# Patient Record
Sex: MALE | Race: WHITE | HISPANIC OR LATINO | Employment: OTHER | URBAN - METROPOLITAN AREA
[De-identification: names, ages, dates, MRNs, and addresses within clinical notes are randomized per-mention and may not be internally consistent; named-entity substitution may affect disease eponyms.]

---

## 2017-01-11 RX ORDER — CLOPIDOGREL BISULFATE 75 MG/1
TABLET ORAL
Qty: 90 TABLET | Refills: 2 | Status: SHIPPED | OUTPATIENT
Start: 2017-01-11 | End: 2017-10-08 | Stop reason: SDUPTHER

## 2017-01-23 RX ORDER — CARVEDILOL 6.25 MG/1
TABLET ORAL
Qty: 180 TABLET | Refills: 2 | Status: SHIPPED | OUTPATIENT
Start: 2017-01-23 | End: 2017-02-01

## 2017-02-01 ENCOUNTER — OFFICE VISIT (OUTPATIENT)
Dept: CARDIOLOGY | Facility: CLINIC | Age: 62
End: 2017-02-01
Payer: OTHER GOVERNMENT

## 2017-02-01 VITALS
OXYGEN SATURATION: 97 % | WEIGHT: 234.13 LBS | HEART RATE: 94 BPM | SYSTOLIC BLOOD PRESSURE: 170 MMHG | BODY MASS INDEX: 34.68 KG/M2 | HEIGHT: 69 IN | DIASTOLIC BLOOD PRESSURE: 99 MMHG

## 2017-02-01 DIAGNOSIS — I25.83 CORONARY ARTERY DISEASE DUE TO LIPID RICH PLAQUE: Primary | ICD-10-CM

## 2017-02-01 DIAGNOSIS — I10 ESSENTIAL HYPERTENSION: ICD-10-CM

## 2017-02-01 DIAGNOSIS — E66.01 MORBID OBESITY DUE TO EXCESS CALORIES: ICD-10-CM

## 2017-02-01 DIAGNOSIS — I25.10 CORONARY ARTERY DISEASE DUE TO LIPID RICH PLAQUE: Primary | ICD-10-CM

## 2017-02-01 DIAGNOSIS — I21.4 NSTEMI (NON-ST ELEVATED MYOCARDIAL INFARCTION): ICD-10-CM

## 2017-02-01 DIAGNOSIS — E78.5 DYSLIPIDEMIA: ICD-10-CM

## 2017-02-01 PROCEDURE — 99999 PR PBB SHADOW E&M-EST. PATIENT-LVL III: CPT | Mod: PBBFAC,,, | Performed by: INTERNAL MEDICINE

## 2017-02-01 PROCEDURE — 99213 OFFICE O/P EST LOW 20 MIN: CPT | Mod: PBBFAC | Performed by: INTERNAL MEDICINE

## 2017-02-01 PROCEDURE — 99214 OFFICE O/P EST MOD 30 MIN: CPT | Mod: S$PBB,,, | Performed by: INTERNAL MEDICINE

## 2017-02-01 RX ORDER — METOPROLOL SUCCINATE 100 MG/1
100 TABLET, EXTENDED RELEASE ORAL DAILY
Qty: 30 TABLET | Refills: 3 | Status: SHIPPED | OUTPATIENT
Start: 2017-02-01 | End: 2018-05-02 | Stop reason: SDUPTHER

## 2017-02-01 NOTE — PROGRESS NOTES
Subjective:    Patient ID:  Steven Sommers is a 61 y.o. male who presents for follow-up of Coronary Artery Disease      Coronary Artery Disease    previous interval history:  Patient is here for follow-up of CAD and chest pain.  He says he had some recurrent left-sided chest pains which felt like little finger sticks.  He described them as 3 out of 10 on pain scale.  There were lesser in severity but similar to his presentation in the hospital when he had his heart attack.  He was at rest without any significant activities.  He says it was not necessarily related to meals.  He was mainly concerned and wanted it make sure there was no problem.  He's been exercising very regularly without any issues.  He did some cardiac rehabilitation without any problems.    Today:  Patient is here for follow-up of CAD and chest pain.  No longer having any significant chest pain symptoms.  He's not exercised in over 3 weeks due to recent travel to [facility].  He wants to get back into exercising is going to the gym tomorrow.  He describes no chest pain, shortness of breath or palpitations.  He's expands no PND, orthopnea or lower edema.  He denies any dizziness, presyncope or syncope.  He's concerned currently about fluid retention.    Review of Systems   Constitution: Negative.   HENT: Negative.    Eyes: Negative.    Cardiovascular: Negative for dyspnea on exertion, irregular heartbeat, near-syncope, orthopnea, paroxysmal nocturnal dyspnea and syncope.   Skin: Negative.    Musculoskeletal: Negative.    Gastrointestinal: Negative for abdominal pain, constipation and diarrhea.   Genitourinary: Negative for dysuria.   Psychiatric/Behavioral: Negative.         Objective:    Physical Exam   Constitutional: He is oriented to person, place, and time. He appears well-developed and well-nourished. No distress.   HENT:   Head: Normocephalic and atraumatic.   Eyes: Conjunctivae and EOM are normal. Pupils are equal, round, and reactive to  light.   Neck: Normal range of motion. Neck supple. No thyromegaly present.   Cardiovascular: Normal rate, regular rhythm and normal heart sounds.    No murmur heard.  Pulmonary/Chest: Effort normal and breath sounds normal. No respiratory distress. He has no wheezes. He has no rales. He exhibits no tenderness.   Abdominal: Soft. Bowel sounds are normal.   Musculoskeletal: He exhibits no edema.   Neurological: He is alert and oriented to person, place, and time.   Skin: Skin is warm and dry.   Psychiatric: He has a normal mood and affect. His behavior is normal.       EKG normal sinus rhythm criteria for LVH (*no change from previous)    Assessment:       1. Coronary artery disease due to lipid rich plaque    2. NSTEMI (non-ST elevated myocardial infarction)    3. Essential hypertension    4. Dyslipidemia    5. Morbid obesity due to excess calories         Plan:       -Mainly reassurance with no significant changes  -follow symptoms closely  -SBP elevated today, continue follow  -Wanted to change to single dosing of beta blocker so we will adjust to Toprol-XL from carvedilol    Return to clinic in 3 mos

## 2017-02-01 NOTE — MR AVS SNAPSHOT
[facility]  [address] [place] [address]  Phone: [phone]                  Steven Sommers   2017 9:40 AM   Office Visit    Description:  Male : 1955   Provider:  Mariusz Prabhakar MD   Department:  [facility]           Reason for Visit     Coronary Artery Disease                To Do List           Future Appointments        Provider Department Dept Phone    2017 9:40 AM Mariusz Prabhakar MD [facility] [phone]      Goals (5 Years of Data)     None      [facility]     [facility] Care Line -  Assistance  Registered nurses in the [facility] On Call Center provide clinical advisement, health education, appointment booking, and other advisory services.  Call for this free service at [phone].             Medications           Message regarding Medications     Verify the changes and/or additions to your medication regime listed below are the same as discussed with your clinician today.  If any of these changes or additions are incorrect, please notify your healthcare provider.             Verify that the below list of medications is an accurate representation of the medications you are currently taking.  If none reported, the list may be blank. If incorrect, please contact your healthcare provider. Carry this list with you in case of emergency.           Current Medications     aspirin (ECOTRIN) 81 MG EC tablet Take 1 tablet (81 mg total) by mouth once daily.    atorvastatin (LIPITOR) 80 MG tablet Take 1 tablet (80 mg total) by mouth once daily.    carvedilol (COREG) 6.25 MG tablet TAKE 1 TABLET TWICE A DAY    CIALIS 20 mg Tab     clopidogrel (PLAVIX) 75 mg tablet TAKE 1 TABLET DAILY    fluticasone (FLONASE) 50 mcg/actuation nasal spray 2 sprays by Each Nare route once daily.    lisinopril-hydrochlorothiazide (PRINZIDE,ZESTORETIC) 10-12.5 mg per tablet Take 1 tablet by mouth once daily.           Clinical Reference  "Information           Vital Signs - Last Recorded  Most recent update: 2/1/2017  8:30 AM by Sheila Calloway MA    BP Pulse Ht Wt SpO2 BMI    (!) 170/99 (BP Location: Left arm, Patient Position: Sitting, BP Method: Automatic) 94 5' 9" (1.753 m) 106.2 kg (234 lb 2.1 oz) 97% 34.57 kg/m2      Blood Pressure          Most Recent Value    BP  (!)  170/99      Allergies as of 2/1/2017     No Known Allergies      Immunizations Administered on Date of Encounter - 2/1/2017     None      [facility] Sign-Up     Activating your [facility] account is as easy as 1-2-3!     1) Visit [email or web address], select Sign Up Now, enter this activation code and your date of birth, then select Next.  [email or web address]  Expires: 3/18/2017  8:32 AM      2) Create a username and password to use when you visit [facility] in the future and select a security question in case you lose your password and select Next.    3) Enter your e-mail address and click Sign Up!    Additional Information  If you have questions, please e-mail [email or web address] or call [phone] to talk to our [facility] staff. Remember, [facility] is NOT to be used for urgent needs. For medical emergencies, dial 911.         "

## 2017-02-09 RX ORDER — ATORVASTATIN CALCIUM 80 MG/1
TABLET, FILM COATED ORAL
Qty: 90 TABLET | Refills: 2 | Status: SHIPPED | OUTPATIENT
Start: 2017-02-09 | End: 2018-01-30 | Stop reason: SDUPTHER

## 2017-05-01 ENCOUNTER — OFFICE VISIT (OUTPATIENT)
Dept: CARDIOLOGY | Facility: CLINIC | Age: 62
End: 2017-05-01
Payer: OTHER GOVERNMENT

## 2017-05-01 VITALS
BODY MASS INDEX: 33.18 KG/M2 | DIASTOLIC BLOOD PRESSURE: 93 MMHG | HEART RATE: 83 BPM | SYSTOLIC BLOOD PRESSURE: 139 MMHG | HEIGHT: 69 IN | OXYGEN SATURATION: 95 % | WEIGHT: 224 LBS

## 2017-05-01 DIAGNOSIS — I25.83 CORONARY ARTERY DISEASE DUE TO LIPID RICH PLAQUE: Primary | ICD-10-CM

## 2017-05-01 DIAGNOSIS — I25.10 CORONARY ARTERY DISEASE DUE TO LIPID RICH PLAQUE: Primary | ICD-10-CM

## 2017-05-01 DIAGNOSIS — E78.5 DYSLIPIDEMIA: ICD-10-CM

## 2017-05-01 DIAGNOSIS — I21.4 NSTEMI (NON-ST ELEVATED MYOCARDIAL INFARCTION): ICD-10-CM

## 2017-05-01 DIAGNOSIS — E66.01 MORBID OBESITY DUE TO EXCESS CALORIES: ICD-10-CM

## 2017-05-01 DIAGNOSIS — I10 ESSENTIAL HYPERTENSION: ICD-10-CM

## 2017-05-01 PROCEDURE — 99999 PR PBB SHADOW E&M-EST. PATIENT-LVL III: CPT | Mod: PBBFAC,,, | Performed by: INTERNAL MEDICINE

## 2017-05-01 PROCEDURE — 99213 OFFICE O/P EST LOW 20 MIN: CPT | Mod: PBBFAC | Performed by: INTERNAL MEDICINE

## 2017-05-01 PROCEDURE — 99214 OFFICE O/P EST MOD 30 MIN: CPT | Mod: S$PBB,,, | Performed by: INTERNAL MEDICINE

## 2017-05-01 NOTE — PROGRESS NOTES
Subjective:    Patient ID:  Steven Sommers is a 61 y.o. male who presents for follow-up of Coronary Artery Disease      Coronary Artery Disease   Symptoms include chest pain.    previous interval history:  Patient is here for follow-up of CAD and chest pain.  No longer having any significant chest pain symptoms.  He's not exercised in over 3 weeks due to recent travel to [facility].  He wants to get back into exercising is going to the gym tomorrow.  He describes no chest pain, shortness of breath or palpitations.  He's expands no PND, orthopnea or lower edema.  He denies any dizziness, presyncope or syncope.  He's concerned currently about fluid retention.    Today:  Here for follow-up of CAD and chest pain.  Patient had 2 episodes of chest pain last 3 months.  He said they were sharp and can of like a poking.  He did not take nitroglycerin.  They lasted seconds and went away.  He's not been exercising regularly.  He's traveling frequently to [facility] to build a house there.  He denies any associated symptoms in terms shortness breath or palpitations.  He denies any PND, orthopnea or lower edema.  He is not expressing dizziness, presyncope or syncope.  He has not had any significant other issues.  He's trying to moderate his diet.    Review of Systems   Constitution: Negative.   HENT: Negative.    Eyes: Negative.    Cardiovascular: Positive for chest pain. Negative for dyspnea on exertion, irregular heartbeat, near-syncope, orthopnea, paroxysmal nocturnal dyspnea and syncope.   Skin: Negative.    Musculoskeletal: Negative.    Gastrointestinal: Negative for abdominal pain, constipation and diarrhea.   Genitourinary: Negative for dysuria.   Psychiatric/Behavioral: Negative.         Objective:    Physical Exam   Constitutional: He is oriented to person, place, and time. He appears well-developed and well-nourished. No distress.   HENT:   Head: Normocephalic and atraumatic.   Eyes: Conjunctivae and EOM are normal.  Pupils are equal, round, and reactive to light.   Neck: Normal range of motion. Neck supple. No thyromegaly present.   Cardiovascular: Normal rate, regular rhythm and normal heart sounds.    No murmur heard.  Pulmonary/Chest: Effort normal and breath sounds normal. No respiratory distress. He has no wheezes. He has no rales. He exhibits no tenderness.   Abdominal: Soft. Bowel sounds are normal.   Musculoskeletal: He exhibits no edema.   Neurological: He is alert and oriented to person, place, and time.   Skin: Skin is warm and dry.   Psychiatric: He has a normal mood and affect. His behavior is normal.         Assessment:       1. Coronary artery disease due to lipid rich plaque    2. NSTEMI (non-ST elevated myocardial infarction)    3. Dyslipidemia    4. Essential hypertension    5. Morbid obesity due to excess calories         Plan:       -Mainly reassurance with no significant changes  -follow symptoms closely  -Continue follow SBP    Return to clinic in 6 mos

## 2017-05-01 NOTE — MR AVS SNAPSHOT
[facility]  [address] [place] [address]  Phone: [phone]                  Steven Sommers   2017 8:20 AM   Office Visit    Description:  Male : 1955   Provider:  Mariusz Prabhakar MD   Department:  [facility]           Reason for Visit     Coronary Artery Disease                To Do List           Goals (5 Years of Data)     None      [facility]     [facility] On Call Nurse Care Line -  Assistance  Unless otherwise directed by your provider, please contact [facility], our nurse care line that is available for  assistance.     Registered nurses in the [facility] provide: appointment scheduling, clinical advisement, health education, and other advisory services.  Call: [phone] (toll free)               Medications           Message regarding Medications     Verify the changes and/or additions to your medication regime listed below are the same as discussed with your clinician today.  If any of these changes or additions are incorrect, please notify your healthcare provider.             Verify that the below list of medications is an accurate representation of the medications you are currently taking.  If none reported, the list may be blank. If incorrect, please contact your healthcare provider. Carry this list with you in case of emergency.           Current Medications     aspirin (ECOTRIN) 81 MG EC tablet Take 1 tablet (81 mg total) by mouth once daily.    atorvastatin (LIPITOR) 80 MG tablet TAKE 1 TABLET DAILY    CIALIS 20 mg Tab     clopidogrel (PLAVIX) 75 mg tablet TAKE 1 TABLET DAILY    fluticasone (FLONASE) 50 mcg/actuation nasal spray 2 sprays by Each Nare route once daily.    lisinopril-hydrochlorothiazide (PRINZIDE,ZESTORETIC) 10-12.5 mg per tablet Take 1 tablet by mouth once daily.    metoprolol succinate (TOPROL-XL) 100 MG 24 hr tablet Take 1 tablet (100 mg total) by mouth once daily.           Clinical  "Reference Information           Your Vitals Were     BP Pulse Height Weight SpO2 BMI    139/93 (BP Location: Left arm, Patient Position: Sitting, BP Method: Automatic) 83 5' 9" (1.753 m) 101.6 kg (224 lb) 95% 33.08 kg/m2      Blood Pressure          Most Recent Value    BP  (!)  139/93      Allergies as of 5/1/2017     No Known Allergies      Immunizations Administered on Date of Encounter - 5/1/2017     None      [facility] Sign-Up     Activating your [facility] account is as easy as 1-2-3!     1) Visit [email or web address], select Sign Up Now, enter this activation code and your date of birth, then select Next.  [email or web address]  Expires: 6/15/2017  8:27 AM      2) Create a username and password to use when you visit [facility] in the future and select a security question in case you lose your password and select Next.    3) Enter your e-mail address and click Sign Up!    Additional Information  If you have questions, please e-mail [email or web address] or call [phone] to talk to our [facility] staff. Remember, [facility] is NOT to be used for urgent needs. For medical emergencies, dial 911.         Language Assistance Services     ATTENTION: Language assistance services are available, free of charge. Please call [phone].      ATENCIÓN: Si habla español, tiene a rosario disposición servicios gratuitos de asistencia lingüística. Llame al [phone].     CHÚ Ý: N?u b?n nói Ti?ng Vi?t, có các d?ch v? h? tr? ngôn ng? mi?n phí dành cho b?n. G?i s? [phone].         [facility] complies with applicable Federal civil rights laws and does not discriminate on the basis of race, color, national origin, age, disability, or sex.        "

## 2017-07-28 ENCOUNTER — DOCUMENTATION ONLY (OUTPATIENT)
Dept: ALLERGY | Facility: CLINIC | Age: 62
End: 2017-07-28

## 2017-07-28 NOTE — PROGRESS NOTES
Discarded 1/2 and 2/2 of red 1:1 expiring 7/17.  Patient never seen by [facility] after Dr. Hernadez joined Allergy clinic.

## 2017-09-08 ENCOUNTER — OFFICE VISIT (OUTPATIENT)
Dept: CARDIOLOGY | Facility: CLINIC | Age: 62
End: 2017-09-08
Payer: OTHER GOVERNMENT

## 2017-09-08 VITALS
BODY MASS INDEX: 32.88 KG/M2 | WEIGHT: 222.69 LBS | HEART RATE: 80 BPM | DIASTOLIC BLOOD PRESSURE: 85 MMHG | OXYGEN SATURATION: 95 % | SYSTOLIC BLOOD PRESSURE: 132 MMHG

## 2017-09-08 DIAGNOSIS — I25.10 CORONARY ARTERY DISEASE INVOLVING NATIVE CORONARY ARTERY OF NATIVE HEART WITHOUT ANGINA PECTORIS: Primary | ICD-10-CM

## 2017-09-08 PROCEDURE — 99214 OFFICE O/P EST MOD 30 MIN: CPT | Mod: S$PBB,,, | Performed by: INTERNAL MEDICINE

## 2017-09-08 PROCEDURE — 3079F DIAST BP 80-89 MM HG: CPT | Mod: ,,, | Performed by: INTERNAL MEDICINE

## 2017-09-08 PROCEDURE — 99213 OFFICE O/P EST LOW 20 MIN: CPT | Mod: PBBFAC | Performed by: INTERNAL MEDICINE

## 2017-09-08 PROCEDURE — 3075F SYST BP GE 130 - 139MM HG: CPT | Mod: ,,, | Performed by: INTERNAL MEDICINE

## 2017-09-08 PROCEDURE — 3008F BODY MASS INDEX DOCD: CPT | Mod: ,,, | Performed by: INTERNAL MEDICINE

## 2017-09-08 PROCEDURE — 99999 PR PBB SHADOW E&M-EST. PATIENT-LVL III: CPT | Mod: PBBFAC,,, | Performed by: INTERNAL MEDICINE

## 2017-09-08 RX ORDER — TERBINAFINE HYDROCHLORIDE 250 MG/1
250 TABLET ORAL DAILY
COMMUNITY

## 2017-09-08 NOTE — PROGRESS NOTES
Subjective:    Patient ID:  Steven Sommers is a 61 y.o. male who presents for follow-up of Follow-up and Hypertension      Hypertension   Associated symptoms include chest pain. Pertinent negatives include no orthopnea or PND.   Coronary Artery Disease   Symptoms include chest pain and dizziness.    previous interval history:  Here for follow-up of CAD and chest pain.  Patient had 2 episodes of chest pain last 3 months.  He said they were sharp and can of like a poking.  He did not take nitroglycerin.  They lasted seconds and went away.  He's not been exercising regularly.  He's traveling frequently to [place] to build a house there.  He denies any associated symptoms in terms shortness breath or palpitations.  He denies any PND, orthopnea or lower edema.  He is not expressing dizziness, presyncope or syncope.  He has not had any significant other issues.  He's trying to moderate his diet.    Today:  Here for follow-up of CAD and chest pain.  He had some worsening dizziness but not to the point of presyncope or syncope.  Again he's had some mild left-sided chest pains but has not taken any nitroglycerin for fear of repercussions with his Cialis intake.  He was recently started on terbinafine by podiatry for foot fungus.  He feels like this may be contributing due to medicine interaction.  He denies any PND, orthopnea or lower edema.  He's not experience again any dizziness to the point of presyncope or syncope.  He's not been working out in a couple weeks due to her onset of symptoms.      Review of Systems   Constitution: Negative.   HENT: Negative.    Eyes: Negative.    Cardiovascular: Positive for chest pain. Negative for dyspnea on exertion, irregular heartbeat, near-syncope, orthopnea, paroxysmal nocturnal dyspnea and syncope.   Skin: Negative.    Musculoskeletal: Negative.    Gastrointestinal: Negative for abdominal pain, constipation and diarrhea.   Genitourinary: Negative for dysuria.   Neurological:  Positive for dizziness.   Psychiatric/Behavioral: Negative.         Objective:    Physical Exam   Constitutional: He is oriented to person, place, and time. He appears well-developed and well-nourished. No distress.   HENT:   Head: Normocephalic and atraumatic.   Eyes: Conjunctivae and EOM are normal. Pupils are equal, round, and reactive to light.   Neck: Normal range of motion. Neck supple. No thyromegaly present.   Cardiovascular: Normal rate, regular rhythm and normal heart sounds.    No murmur heard.  Pulmonary/Chest: Effort normal and breath sounds normal. No respiratory distress. He has no wheezes. He has no rales. He exhibits no tenderness.   Abdominal: Soft. Bowel sounds are normal.   Musculoskeletal: He exhibits no edema.   Neurological: He is alert and oriented to person, place, and time.   Skin: Skin is warm and dry.   Psychiatric: He has a normal mood and affect. His behavior is normal.       [facility]: 2-16  DIAGNOSTIC:       Patient has a right dominant coronary artery.        - Left Main Coronary Artery:             The LM is normal. There is GLYNN 3 flow.       - Left Anterior Descending Artery:             The mid LAD has a 95% stenosis. There is GLYNN 3 flow.       - Left Circumflex Artery:             The LCX is normal. There is GLYNN 3 flow.       - Right Coronary Artery:             The RCA is normal. There is GLYNN 3 flow.    INTERVENTION:         Mid LAD:              The lesion was successfully intervened. Post-stenosis of 0% and post-GLYNN 3 flow. The vessel was accessed natively.  This was a MI culprit lesion.  The Anterior myocardial wall was affected. The following items were used: Blln Emerge 2.5 X12   and Stent Resolute 3.0x15 (GISSELLE).    Assessment:       1. Coronary artery disease involving native coronary artery of native heart without angina pectoris         Plan:       -Repeat testing in light of continued symptoms  -Hold antifungal currently, if continued symptoms would then Brandite Cialis  as well  -SBP is stable    Return to clinic in 6 mos

## 2017-09-12 ENCOUNTER — TELEPHONE (OUTPATIENT)
Dept: CARDIOLOGY | Facility: CLINIC | Age: 62
End: 2017-09-12

## 2017-09-18 ENCOUNTER — HOSPITAL ENCOUNTER (OUTPATIENT)
Dept: CARDIOLOGY | Facility: HOSPITAL | Age: 62
Discharge: HOME | End: 2017-09-18
Attending: INTERNAL MEDICINE
Payer: OTHER GOVERNMENT

## 2017-09-18 ENCOUNTER — HOSPITAL ENCOUNTER (OUTPATIENT)
Dept: RADIOLOGY | Facility: HOSPITAL | Age: 62
Discharge: HOME | End: 2017-09-18
Attending: INTERNAL MEDICINE
Payer: OTHER GOVERNMENT

## 2017-09-18 DIAGNOSIS — I25.10 CORONARY ARTERY DISEASE INVOLVING NATIVE CORONARY ARTERY OF NATIVE HEART WITHOUT ANGINA PECTORIS: ICD-10-CM

## 2017-09-18 LAB
DIASTOLIC DYSFUNCTION: NO
DIASTOLIC DYSFUNCTION: YES
ESTIMATED PA SYSTOLIC PRESSURE: 35.95
MITRAL VALVE REGURGITATION: ABNORMAL
RETIRED EF AND QEF - SEE NOTES: 55 (ref 55–65)
TRICUSPID VALVE REGURGITATION: ABNORMAL

## 2017-09-18 PROCEDURE — 93017 CV STRESS TEST TRACING ONLY: CPT

## 2017-09-18 PROCEDURE — 78452 HT MUSCLE IMAGE SPECT MULT: CPT | Mod: TC

## 2017-09-18 PROCEDURE — 93018 CV STRESS TEST I&R ONLY: CPT | Mod: ,,, | Performed by: INTERNAL MEDICINE

## 2017-09-18 PROCEDURE — 93016 CV STRESS TEST SUPVJ ONLY: CPT | Mod: ,,, | Performed by: INTERNAL MEDICINE

## 2017-09-18 PROCEDURE — 93306 TTE W/DOPPLER COMPLETE: CPT

## 2017-09-18 PROCEDURE — 93306 TTE W/DOPPLER COMPLETE: CPT | Mod: 26,,, | Performed by: INTERNAL MEDICINE

## 2017-09-18 PROCEDURE — 78452 HT MUSCLE IMAGE SPECT MULT: CPT | Mod: 26,,, | Performed by: INTERNAL MEDICINE

## 2017-09-18 PROCEDURE — 63600175 PHARM REV CODE 636 W HCPCS

## 2017-09-18 RX ORDER — REGADENOSON 0.08 MG/ML
INJECTION, SOLUTION INTRAVENOUS
Status: DISPENSED
Start: 2017-09-18 | End: 2017-09-18

## 2017-10-04 ENCOUNTER — OFFICE VISIT (OUTPATIENT)
Dept: CARDIOLOGY | Facility: CLINIC | Age: 62
End: 2017-10-04
Payer: OTHER GOVERNMENT

## 2017-10-04 VITALS
SYSTOLIC BLOOD PRESSURE: 138 MMHG | HEART RATE: 67 BPM | BODY MASS INDEX: 32.88 KG/M2 | WEIGHT: 222.69 LBS | DIASTOLIC BLOOD PRESSURE: 92 MMHG | OXYGEN SATURATION: 95 %

## 2017-10-04 DIAGNOSIS — E78.5 DYSLIPIDEMIA: ICD-10-CM

## 2017-10-04 DIAGNOSIS — E66.01 MORBID OBESITY DUE TO EXCESS CALORIES: ICD-10-CM

## 2017-10-04 DIAGNOSIS — I10 ESSENTIAL HYPERTENSION: ICD-10-CM

## 2017-10-04 DIAGNOSIS — I21.4 NSTEMI (NON-ST ELEVATED MYOCARDIAL INFARCTION): ICD-10-CM

## 2017-10-04 DIAGNOSIS — I25.10 CORONARY ARTERY DISEASE INVOLVING NATIVE CORONARY ARTERY OF NATIVE HEART WITHOUT ANGINA PECTORIS: Primary | ICD-10-CM

## 2017-10-04 PROCEDURE — 99214 OFFICE O/P EST MOD 30 MIN: CPT | Mod: S$PBB,,, | Performed by: INTERNAL MEDICINE

## 2017-10-04 PROCEDURE — 99213 OFFICE O/P EST LOW 20 MIN: CPT | Mod: PBBFAC | Performed by: INTERNAL MEDICINE

## 2017-10-04 PROCEDURE — 99999 PR PBB SHADOW E&M-EST. PATIENT-LVL III: CPT | Mod: PBBFAC,,, | Performed by: INTERNAL MEDICINE

## 2017-10-04 RX ORDER — CARVEDILOL 6.25 MG/1
6.25 TABLET ORAL 2 TIMES DAILY WITH MEALS
COMMUNITY
End: 2018-05-03

## 2017-10-04 NOTE — PROGRESS NOTES
Subjective:    Patient ID:  Steven Sommers is a 62 y.o. male who presents for follow-up of Follow-up (2 weeks )      Hypertension   Pertinent negatives include no chest pain, orthopnea or PND.   Coronary Artery Disease   Symptoms include dizziness. Pertinent negatives include no chest pain.    previous interval history:  Here for follow-up of CAD and chest pain.  He had some worsening dizziness but not to the point of presyncope or syncope.  Again he's had some mild left-sided chest pains but has not taken any nitroglycerin for fear of repercussions with his Cialis intake.  He was recently started on terbinafine by podiatry for foot fungus.  He feels like this may be contributing due to medicine interaction.  He denies any PND, orthopnea or lower edema.  He's not experience again any dizziness to the point of presyncope or syncope.  He's not been working out in a couple weeks due to her onset of symptoms.    Today:  Here for follow-up of coronary artery disease.  He denies any worsening chest pain symptoms.  He's mainly worried about the dizziness that he previously had.  He's not been working out or watching his diet.  He is compliant with his medicines.  He denies any other associated symptoms.  He's express no PND, orthopnea or lower edema.  She's not expressing dizziness to the point of presyncope or syncope.    Review of Systems   Constitution: Negative.   HENT: Negative.    Eyes: Negative.    Cardiovascular: Negative for chest pain, dyspnea on exertion, irregular heartbeat, near-syncope, orthopnea, paroxysmal nocturnal dyspnea and syncope.   Skin: Negative.    Musculoskeletal: Negative.    Gastrointestinal: Negative for abdominal pain, constipation and diarrhea.   Genitourinary: Negative for dysuria.   Neurological: Positive for dizziness.   Psychiatric/Behavioral: Negative.         Objective:    Physical Exam   Constitutional: He is oriented to person, place, and time. He appears well-developed and  well-nourished. No distress.   HENT:   Head: Normocephalic and atraumatic.   Eyes: Conjunctivae and EOM are normal. Pupils are equal, round, and reactive to light.   Neck: Normal range of motion. Neck supple. No thyromegaly present.   Cardiovascular: Normal rate, regular rhythm and normal heart sounds.    No murmur heard.  Pulmonary/Chest: Effort normal and breath sounds normal. No respiratory distress. He has no wheezes. He has no rales. He exhibits no tenderness.   Abdominal: Soft. Bowel sounds are normal.   Musculoskeletal: He exhibits no edema.   Neurological: He is alert and oriented to person, place, and time.   Skin: Skin is warm and dry.   Psychiatric: He has a normal mood and affect. His behavior is normal.       [facility]: 2-16  DIAGNOSTIC:       Patient has a right dominant coronary artery.        - Left Main Coronary Artery:             The LM is normal. There is GLYNN 3 flow.       - Left Anterior Descending Artery:             The mid LAD has a 95% stenosis. There is GLYNN 3 flow.       - Left Circumflex Artery:             The LCX is normal. There is GLYNN 3 flow.       - Right Coronary Artery:             The RCA is normal. There is GLYNN 3 flow.    INTERVENTION:         Mid LAD:              The lesion was successfully intervened. Post-stenosis of 0% and post-GLYNN 3 flow. The vessel was accessed natively.  This was a MI culprit lesion.  The Anterior myocardial wall was affected. The following items were used: Blln Emerge 2.5 X12   and Stent Resolute 3.0x15 (GISSELLE).    Assessment:       1. Coronary artery disease involving native coronary artery of native heart without angina pectoris    2. NSTEMI (non-ST elevated myocardial infarction)    3. Dyslipidemia    4. Essential hypertension    5. Morbid obesity due to excess calories         Plan:       -Continue med therapy  -Currently reassurance in light of testing  -Hold antifungal currently, if continued symptoms would then Descalate Cialis as well  -SBP is  stable, continue follow    Return to clinic in 3 months

## 2017-10-08 RX ORDER — CLOPIDOGREL BISULFATE 75 MG/1
TABLET ORAL
Qty: 90 TABLET | Refills: 2 | Status: SHIPPED | OUTPATIENT
Start: 2017-10-08 | End: 2018-11-27 | Stop reason: SDUPTHER

## 2018-01-30 RX ORDER — ATORVASTATIN CALCIUM 80 MG/1
80 TABLET, FILM COATED ORAL DAILY
Qty: 90 TABLET | Refills: 3 | Status: SHIPPED | OUTPATIENT
Start: 2018-01-30 | End: 2018-12-13 | Stop reason: SDUPTHER

## 2018-05-02 RX ORDER — METOPROLOL SUCCINATE 100 MG/1
100 TABLET, EXTENDED RELEASE ORAL DAILY
Qty: 90 TABLET | Refills: 2 | Status: SHIPPED | OUTPATIENT
Start: 2018-05-02 | End: 2018-05-03 | Stop reason: SDUPTHER

## 2018-05-03 ENCOUNTER — OFFICE VISIT (OUTPATIENT)
Dept: CARDIOLOGY | Facility: CLINIC | Age: 63
End: 2018-05-03
Payer: OTHER GOVERNMENT

## 2018-05-03 VITALS
OXYGEN SATURATION: 93 % | SYSTOLIC BLOOD PRESSURE: 136 MMHG | WEIGHT: 235.88 LBS | DIASTOLIC BLOOD PRESSURE: 60 MMHG | HEART RATE: 65 BPM | RESPIRATION RATE: 20 BRPM | BODY MASS INDEX: 34.84 KG/M2

## 2018-05-03 DIAGNOSIS — E66.01 MORBID OBESITY DUE TO EXCESS CALORIES: ICD-10-CM

## 2018-05-03 DIAGNOSIS — I21.4 NSTEMI (NON-ST ELEVATED MYOCARDIAL INFARCTION): ICD-10-CM

## 2018-05-03 DIAGNOSIS — I10 ESSENTIAL HYPERTENSION: ICD-10-CM

## 2018-05-03 DIAGNOSIS — I25.10 CORONARY ARTERY DISEASE INVOLVING NATIVE CORONARY ARTERY OF NATIVE HEART WITHOUT ANGINA PECTORIS: Primary | ICD-10-CM

## 2018-05-03 DIAGNOSIS — E78.5 DYSLIPIDEMIA: ICD-10-CM

## 2018-05-03 PROCEDURE — 99999 PR PBB SHADOW E&M-EST. PATIENT-LVL III: CPT | Mod: PBBFAC,,, | Performed by: INTERNAL MEDICINE

## 2018-05-03 PROCEDURE — 99213 OFFICE O/P EST LOW 20 MIN: CPT | Mod: PBBFAC | Performed by: INTERNAL MEDICINE

## 2018-05-03 PROCEDURE — 99214 OFFICE O/P EST MOD 30 MIN: CPT | Mod: S$PBB,,, | Performed by: INTERNAL MEDICINE

## 2018-05-03 RX ORDER — METOPROLOL SUCCINATE 100 MG/1
100 TABLET, EXTENDED RELEASE ORAL DAILY
Qty: 90 TABLET | Refills: 3 | Status: SHIPPED | OUTPATIENT
Start: 2018-05-03 | End: 2018-12-13 | Stop reason: SDUPTHER

## 2018-05-03 NOTE — PROGRESS NOTES
Subjective:    Patient ID:  Steven Sommers is a 62 y.o. male who presents for follow-up of No chief complaint on file.      Hypertension   Pertinent negatives include no chest pain, orthopnea or PND.   Coronary Artery Disease   Pertinent negatives include no chest pain or dizziness.    previous interval history:  Here for follow-up of coronary artery disease.  He denies any worsening chest pain symptoms.  He's mainly worried about the dizziness that he previously had.  He's not been working out or watching his diet.  He is compliant with his medicines.  He denies any other associated symptoms.  He's express no PND, orthopnea or lower edema.  She's not expressing dizziness to the point of presyncope or syncope.    Today:  Here for follow-up of coronary artery disease.  He denies any worsening cardiopulmonary complaints.  He had some brief fluttering couple months ago but resolved very quickly.  He's been exercising little bit but still noncompliant with diet.  He plans on working less and traveling more.  Is taking his mom was 84 years of age on a two-month trip to [place].  He denies any PND, orthopnea or lower edema.  He's not expressing dizziness, presyncope or syncope.      Review of Systems   Constitution: Negative.   HENT: Negative.    Eyes: Negative.    Cardiovascular: Negative for chest pain, dyspnea on exertion, irregular heartbeat, near-syncope, orthopnea, paroxysmal nocturnal dyspnea and syncope.   Skin: Negative.    Musculoskeletal: Negative.    Gastrointestinal: Negative for abdominal pain, constipation and diarrhea.   Genitourinary: Negative for dysuria.   Neurological: Negative for dizziness.   Psychiatric/Behavioral: Negative.         Objective:    Physical Exam   Constitutional: He is oriented to person, place, and time. He appears well-developed and well-nourished. No distress.   HENT:   Head: Normocephalic and atraumatic.   Eyes: Conjunctivae and EOM are normal. Pupils are equal, round,  and reactive to light.   Neck: Normal range of motion. Neck supple. No thyromegaly present.   Cardiovascular: Normal rate, regular rhythm and normal heart sounds.    No murmur heard.  Pulmonary/Chest: Effort normal and breath sounds normal. No respiratory distress. He has no wheezes. He has no rales. He exhibits no tenderness.   Abdominal: Soft. Bowel sounds are normal.   Musculoskeletal: He exhibits no edema.   Neurological: He is alert and oriented to person, place, and time.   Skin: Skin is warm and dry.   Psychiatric: He has a normal mood and affect. His behavior is normal.       [facility]: 2-16  DIAGNOSTIC:       Patient has a right dominant coronary artery.        - Left Main Coronary Artery:             The LM is normal. There is GLYNN 3 flow.       - Left Anterior Descending Artery:             The mid LAD has a 95% stenosis. There is GLYNN 3 flow.       - Left Circumflex Artery:             The LCX is normal. There is GLYNN 3 flow.       - Right Coronary Artery:             The RCA is normal. There is GLYNN 3 flow.    INTERVENTION:         Mid LAD:              The lesion was successfully intervened. Post-stenosis of 0% and post-GLYNN 3 flow. The vessel was accessed natively.  This was a MI culprit lesion.  The Anterior myocardial wall was affected. The following items were used: Blln Emerge 2.5 X12   and Stent Resolute 3.0x15 (GISSELLE).    Assessment:       1. Coronary artery disease involving native coronary artery of native heart without angina pectoris    2. NSTEMI (non-ST elevated myocardial infarction)    3. Dyslipidemia    4. Essential hypertension    5. Morbid obesity due to excess calories         Plan:       -Continue med therapy  -Currently reassurance in light of testing  -SBP is stable, continue follow  -Carotid screen  -Check labs in particular lipid profile    Return to clinic in 6 months

## 2018-05-07 ENCOUNTER — HOSPITAL ENCOUNTER (OUTPATIENT)
Dept: CARDIOLOGY | Facility: HOSPITAL | Age: 63
Discharge: HOME | End: 2018-05-07
Attending: INTERNAL MEDICINE
Payer: OTHER GOVERNMENT

## 2018-05-07 DIAGNOSIS — I25.10 CORONARY ARTERY DISEASE INVOLVING NATIVE CORONARY ARTERY OF NATIVE HEART WITHOUT ANGINA PECTORIS: ICD-10-CM

## 2018-05-07 PROCEDURE — 93880 EXTRACRANIAL BILAT STUDY: CPT

## 2018-05-07 PROCEDURE — 93880 EXTRACRANIAL BILAT STUDY: CPT | Mod: 26,,, | Performed by: INTERNAL MEDICINE

## 2018-05-08 LAB — INTERNAL CAROTID STENOSIS: NORMAL

## 2018-11-27 RX ORDER — CLOPIDOGREL BISULFATE 75 MG/1
75 TABLET ORAL DAILY
Qty: 90 TABLET | Refills: 2 | Status: SHIPPED | OUTPATIENT
Start: 2018-11-27 | End: 2018-12-13 | Stop reason: SDUPTHER

## 2018-12-13 ENCOUNTER — OFFICE VISIT (OUTPATIENT)
Dept: CARDIOLOGY | Facility: CLINIC | Age: 63
End: 2018-12-13
Payer: MEDICARE

## 2018-12-13 VITALS
WEIGHT: 235.88 LBS | SYSTOLIC BLOOD PRESSURE: 124 MMHG | RESPIRATION RATE: 20 BRPM | OXYGEN SATURATION: 91 % | DIASTOLIC BLOOD PRESSURE: 76 MMHG | BODY MASS INDEX: 34.84 KG/M2 | HEART RATE: 72 BPM

## 2018-12-13 DIAGNOSIS — I10 ESSENTIAL HYPERTENSION: ICD-10-CM

## 2018-12-13 DIAGNOSIS — E78.5 DYSLIPIDEMIA: ICD-10-CM

## 2018-12-13 DIAGNOSIS — E66.01 MORBID OBESITY DUE TO EXCESS CALORIES: ICD-10-CM

## 2018-12-13 DIAGNOSIS — I25.10 CORONARY ARTERY DISEASE INVOLVING NATIVE CORONARY ARTERY OF NATIVE HEART WITHOUT ANGINA PECTORIS: Primary | ICD-10-CM

## 2018-12-13 DIAGNOSIS — I21.4 NSTEMI (NON-ST ELEVATED MYOCARDIAL INFARCTION): ICD-10-CM

## 2018-12-13 DIAGNOSIS — I25.10 CAD (CORONARY ARTERY DISEASE): ICD-10-CM

## 2018-12-13 PROCEDURE — 99999 PR PBB SHADOW E&M-EST. PATIENT-LVL III: CPT | Mod: PBBFAC,,, | Performed by: INTERNAL MEDICINE

## 2018-12-13 PROCEDURE — 99214 OFFICE O/P EST MOD 30 MIN: CPT | Mod: S$PBB,,, | Performed by: INTERNAL MEDICINE

## 2018-12-13 PROCEDURE — 99213 OFFICE O/P EST LOW 20 MIN: CPT | Mod: PBBFAC | Performed by: INTERNAL MEDICINE

## 2018-12-13 PROCEDURE — 93010 ELECTROCARDIOGRAM REPORT: CPT | Mod: S$PBB,,, | Performed by: INTERNAL MEDICINE

## 2018-12-13 PROCEDURE — 93005 ELECTROCARDIOGRAM TRACING: CPT | Mod: PBBFAC | Performed by: INTERNAL MEDICINE

## 2018-12-13 RX ORDER — CLOPIDOGREL BISULFATE 75 MG/1
75 TABLET ORAL DAILY
Qty: 90 TABLET | Refills: 3 | Status: SHIPPED | OUTPATIENT
Start: 2018-12-13 | End: 2019-05-16 | Stop reason: SDUPTHER

## 2018-12-13 RX ORDER — METOPROLOL SUCCINATE 100 MG/1
100 TABLET, EXTENDED RELEASE ORAL DAILY
Qty: 90 TABLET | Refills: 3 | Status: SHIPPED | OUTPATIENT
Start: 2018-12-13 | End: 2019-05-16 | Stop reason: SDUPTHER

## 2018-12-13 RX ORDER — ATORVASTATIN CALCIUM 80 MG/1
80 TABLET, FILM COATED ORAL DAILY
Qty: 90 TABLET | Refills: 3 | Status: SHIPPED | OUTPATIENT
Start: 2018-12-13 | End: 2019-05-16 | Stop reason: SDUPTHER

## 2018-12-13 NOTE — PROGRESS NOTES
Subjective:    Patient ID:  Steven Sommers is a 63 y.o. male who presents for follow-up of No chief complaint on file.      Hypertension   Pertinent negatives include no chest pain, orthopnea or PND.   Coronary Artery Disease   Pertinent negatives include no chest pain or dizziness.      previous interval history:  Here for follow-up of coronary artery disease.  He denies any worsening cardiopulmonary complaints.  He had some brief fluttering couple months ago but resolved very quickly.  He's been exercising little bit but still noncompliant with diet.  He plans on working less and traveling more.  Is taking his mom was 84 years of age on a two-month trip to [place].  He denies any PND, orthopnea or lower edema.  He's not expressing dizziness, presyncope or syncope.    Today:  Her follow-up coronary artery disease.  He has otherwise been in his usual state health without any problems.  He denies any chest pain.  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  He denies any PND, orthopnea or lower extremity edema.  He has experiencing dizziness, presyncope or syncope.      Review of Systems   Constitution: Negative.   HENT: Negative.    Eyes: Negative.    Cardiovascular: Negative for chest pain, dyspnea on exertion, irregular heartbeat, near-syncope, orthopnea, paroxysmal nocturnal dyspnea and syncope.   Skin: Negative.    Musculoskeletal: Negative.    Gastrointestinal: Negative for abdominal pain, constipation and diarrhea.   Genitourinary: Negative for dysuria.   Neurological: Negative for dizziness.   Psychiatric/Behavioral: Negative.         Objective:    Physical Exam   Constitutional: He is oriented to person, place, and time. He appears well-developed and well-nourished. No distress.   HENT:   Head: Normocephalic and atraumatic.   Eyes: Conjunctivae and EOM are normal. Pupils are equal, round, and reactive to light.    Neck: Normal range of motion. Neck supple. No thyromegaly present.   Cardiovascular: Normal rate, regular rhythm and normal heart sounds.   No murmur heard.  Pulmonary/Chest: Effort normal and breath sounds normal. No respiratory distress. He has no wheezes. He has no rales. He exhibits no tenderness.   Abdominal: Soft. Bowel sounds are normal.   Musculoskeletal: He exhibits no edema.   Neurological: He is alert and oriented to person, place, and time.   Skin: Skin is warm and dry.   Psychiatric: He has a normal mood and affect. His behavior is normal.       [facility]: 2-16  DIAGNOSTIC:       Patient has a right dominant coronary artery.        - Left Main Coronary Artery:             The LM is normal. There is GLYNN 3 flow.       - Left Anterior Descending Artery:             The mid LAD has a 95% stenosis. There is GLYNN 3 flow.       - Left Circumflex Artery:             The LCX is normal. There is GLYNN 3 flow.       - Right Coronary Artery:             The RCA is normal. There is GLYNN 3 flow.    INTERVENTION:         Mid LAD:              The lesion was successfully intervened. Post-stenosis of 0% and post-GLYNN 3 flow. The vessel was accessed natively.  This was a MI culprit lesion.  The Anterior myocardial wall was affected. The following items were used: Blln Emerge 2.5 X12   and Stent Resolute 3.0x15 (GISSELLE).    Carotid ultrasound:  5-18  CONCLUSIONS   There is 20 - 39% right Internal Carotid stenosis.  There is 0 - 19% left Internal Carotid stenosis.    LDL-  85   5-18    Assessment:       1. Coronary artery disease involving native coronary artery of native heart without angina pectoris    2. NSTEMI (non-ST elevated myocardial infarction)    3. Dyslipidemia    4. Essential hypertension    5. Morbid obesity due to excess calories         Plan:       -Continue med therapy  -Currently reassurance in light of testing  -SBP is stable, continue follow        Return to clinic in 6 months

## 2019-05-16 ENCOUNTER — OFFICE VISIT (OUTPATIENT)
Dept: CARDIOLOGY | Facility: CLINIC | Age: 64
End: 2019-05-16
Payer: MEDICARE

## 2019-05-16 VITALS
SYSTOLIC BLOOD PRESSURE: 144 MMHG | HEART RATE: 76 BPM | WEIGHT: 235.88 LBS | RESPIRATION RATE: 16 BRPM | OXYGEN SATURATION: 96 % | BODY MASS INDEX: 34.84 KG/M2 | DIASTOLIC BLOOD PRESSURE: 96 MMHG

## 2019-05-16 DIAGNOSIS — E78.5 DYSLIPIDEMIA: ICD-10-CM

## 2019-05-16 DIAGNOSIS — E66.01 MORBID OBESITY DUE TO EXCESS CALORIES: ICD-10-CM

## 2019-05-16 DIAGNOSIS — I21.4 NSTEMI (NON-ST ELEVATED MYOCARDIAL INFARCTION): ICD-10-CM

## 2019-05-16 DIAGNOSIS — I25.10 CORONARY ARTERY DISEASE INVOLVING NATIVE CORONARY ARTERY OF NATIVE HEART WITHOUT ANGINA PECTORIS: Primary | ICD-10-CM

## 2019-05-16 DIAGNOSIS — I10 ESSENTIAL HYPERTENSION: ICD-10-CM

## 2019-05-16 DIAGNOSIS — Z01.810 PREOP CARDIOVASCULAR EXAM: ICD-10-CM

## 2019-05-16 PROCEDURE — 99214 OFFICE O/P EST MOD 30 MIN: CPT | Mod: S$PBB,,, | Performed by: INTERNAL MEDICINE

## 2019-05-16 PROCEDURE — 93010 EKG 12-LEAD: ICD-10-PCS | Mod: S$PBB,,, | Performed by: INTERNAL MEDICINE

## 2019-05-16 PROCEDURE — 93005 ELECTROCARDIOGRAM TRACING: CPT | Mod: PBBFAC | Performed by: INTERNAL MEDICINE

## 2019-05-16 PROCEDURE — 99999 PR PBB SHADOW E&M-EST. PATIENT-LVL III: CPT | Mod: PBBFAC,,, | Performed by: INTERNAL MEDICINE

## 2019-05-16 PROCEDURE — 99213 OFFICE O/P EST LOW 20 MIN: CPT | Mod: PBBFAC,25 | Performed by: INTERNAL MEDICINE

## 2019-05-16 PROCEDURE — 99214 PR OFFICE/OUTPT VISIT, EST, LEVL IV, 30-39 MIN: ICD-10-PCS | Mod: S$PBB,,, | Performed by: INTERNAL MEDICINE

## 2019-05-16 PROCEDURE — 93010 ELECTROCARDIOGRAM REPORT: CPT | Mod: S$PBB,,, | Performed by: INTERNAL MEDICINE

## 2019-05-16 PROCEDURE — 99999 PR PBB SHADOW E&M-EST. PATIENT-LVL III: ICD-10-PCS | Mod: PBBFAC,,, | Performed by: INTERNAL MEDICINE

## 2019-05-16 RX ORDER — LISINOPRIL AND HYDROCHLOROTHIAZIDE 10; 12.5 MG/1; MG/1
1 TABLET ORAL DAILY
Qty: 90 TABLET | Refills: 3 | Status: SHIPPED | OUTPATIENT
Start: 2019-05-16 | End: 2019-06-10 | Stop reason: SDUPTHER

## 2019-05-16 RX ORDER — CLOPIDOGREL BISULFATE 75 MG/1
75 TABLET ORAL DAILY
Qty: 90 TABLET | Refills: 3 | Status: SHIPPED | OUTPATIENT
Start: 2019-05-16

## 2019-05-16 RX ORDER — ATORVASTATIN CALCIUM 80 MG/1
80 TABLET, FILM COATED ORAL DAILY
Qty: 90 TABLET | Refills: 3 | Status: SHIPPED | OUTPATIENT
Start: 2019-05-16

## 2019-05-16 RX ORDER — METOPROLOL SUCCINATE 100 MG/1
100 TABLET, EXTENDED RELEASE ORAL DAILY
Qty: 90 TABLET | Refills: 3 | Status: SHIPPED | OUTPATIENT
Start: 2019-05-16

## 2019-05-16 NOTE — PROGRESS NOTES
Subjective:    Patient ID:  Steven Sommers is a 63 y.o. male who presents for follow-up of CAD F/U (hernia repair )      Hypertension   Pertinent negatives include no chest pain, orthopnea or PND.   Coronary Artery Disease   Pertinent negatives include no chest pain or dizziness.      previous interval history:  Her follow-up coronary artery disease.  He has otherwise been in his usual state health without any problems.  He denies any chest pain.  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  He denies any PND, orthopnea or lower extremity edema.  He has experiencing dizziness, presyncope or syncope.    Today:  Here for follow-up of coronary artery disease.  He denies any worsening cardiopulmonary complaints.  He needs clearance for inguinal hernia repair.  He says he has been hampered by this and cannot exercise.  Otherwise been in his usual state health.  He denies any chest pain or palpitations.  He has experienced no PND, orthopnea or lower extremity edema.  He denies any dizziness, presyncope or syncope.      Review of Systems   Constitution: Negative.   HENT: Negative.    Eyes: Negative.    Cardiovascular: Negative for chest pain, dyspnea on exertion, irregular heartbeat, near-syncope, orthopnea, paroxysmal nocturnal dyspnea and syncope.   Skin: Negative.    Musculoskeletal: Negative.    Gastrointestinal: Negative for abdominal pain, constipation and diarrhea.   Genitourinary: Negative for dysuria.   Neurological: Negative for dizziness.   Psychiatric/Behavioral: Negative.         Objective:    Physical Exam   Constitutional: He is oriented to person, place, and time. He appears well-developed and well-nourished. No distress.   HENT:   Head: Normocephalic and atraumatic.   Eyes: Pupils are equal, round, and reactive to light. Conjunctivae and EOM are normal.   Neck: Normal range of motion. Neck supple. No thyromegaly  present.   Cardiovascular: Normal rate, regular rhythm and normal heart sounds.   No murmur heard.  Pulmonary/Chest: Effort normal and breath sounds normal. No respiratory distress. He has no wheezes. He has no rales. He exhibits no tenderness.   Abdominal: Soft. Bowel sounds are normal.   Musculoskeletal: He exhibits no edema.   Neurological: He is alert and oriented to person, place, and time.   Skin: Skin is warm and dry.   Psychiatric: He has a normal mood and affect. His behavior is normal.       [facility]: 2-16  DIAGNOSTIC:       Patient has a right dominant coronary artery.        - Left Main Coronary Artery:             The LM is normal. There is GLYNN 3 flow.       - Left Anterior Descending Artery:             The mid LAD has a 95% stenosis. There is GLYNN 3 flow.       - Left Circumflex Artery:             The LCX is normal. There is GLYNN 3 flow.       - Right Coronary Artery:             The RCA is normal. There is GLYNN 3 flow.    INTERVENTION:         Mid LAD:              The lesion was successfully intervened. Post-stenosis of 0% and post-GLYNN 3 flow. The vessel was accessed natively.  This was a MI culprit lesion.  The Anterior myocardial wall was affected. The following items were used: Blln Emerge 2.5 X12   and Stent Resolute 3.0x15 (GISSELLE).    Carotid ultrasound:  5-18  CONCLUSIONS   There is 20 - 39% right Internal Carotid stenosis.  There is 0 - 19% left Internal Carotid stenosis.    NST:  2017  Impression: NORMAL MYOCARDIAL PERFUSION  1. The perfusion scan is free of evidence for myocardial ischemia or injury.   2. There is a mild intensity fixed defect in the inferior wall of the left ventricle, secondary to diaphragm attenuation.   3. Resting wall motion is physiologic.   4. Resting LV function is normal.   5. The ventricular volumes are normal at rest and stress.   6. The extracardiac distribution of radioactivity is normal.     LDL-  85   5-18    EKG today shows normal sinus rhythm    Assessment:        1. Coronary artery disease involving native coronary artery of native heart without angina pectoris    2. NSTEMI (non-ST elevated myocardial infarction)    3. Dyslipidemia    4. Essential hypertension    5. Morbid obesity due to excess calories         Plan:       -Continue med therapy  -check echo  -SBP is stable, continue follow  -check labs in particular lipid profile  -cleared at low to intermediate risk, okay to hold dual anti-platelet therapy 5-7 days prior to the procedure        Return to clinic in 6 months with testing now

## 2019-05-16 NOTE — LETTER
May 16, 2019    Stevenluke Sommers  [address] [place] [address]             [facility] [place] - Cardiology  [address]  [place] [address]  Phone: [phone]   Steven Sommers was seen by me on 5/16/2019 and is cleared for hernia repair with low to intermediate risk from Cardiovascular standpoint. Ok to hold Plavix and Aspirin for 5 to 7 days as needed     If you have any questions or concerns, please don't hesitate to call.    Sincerely,      Mariusz Prabhakar MD

## 2019-05-20 ENCOUNTER — HOSPITAL ENCOUNTER (OUTPATIENT)
Dept: CARDIOLOGY | Facility: HOSPITAL | Age: 64
Discharge: HOME | End: 2019-05-20
Attending: INTERNAL MEDICINE
Payer: MEDICARE

## 2019-05-20 VITALS — BODY MASS INDEX: 34.8 KG/M2 | HEIGHT: 69 IN | WEIGHT: 235 LBS

## 2019-05-20 DIAGNOSIS — E78.5 DYSLIPIDEMIA: ICD-10-CM

## 2019-05-20 DIAGNOSIS — I25.10 CORONARY ARTERY DISEASE INVOLVING NATIVE CORONARY ARTERY OF NATIVE HEART WITHOUT ANGINA PECTORIS: ICD-10-CM

## 2019-05-20 LAB
AORTIC ROOT ANNULUS: 3.64 CM
AORTIC VALVE CUSP SEPERATION: 1.7 CM
ASCENDING AORTA: 3.07 CM
AV INDEX (PROSTH): 0.69
AV MEAN GRADIENT: 6.44 MMHG
AV PEAK GRADIENT: 11.42 MMHG
AV VALVE AREA: 2.19 CM2
AV VELOCITY RATIO: 0.63
BSA FOR ECHO PROCEDURE: 2.28 M2
CV ECHO LV RWT: 0.69 CM
DOP CALC AO PEAK VEL: 1.69 M/S
DOP CALC AO VTI: 35.73 CM
DOP CALC LVOT AREA: 3.17 CM2
DOP CALC LVOT DIAMETER: 2.01 CM
DOP CALC LVOT PEAK VEL: 1.06 M/S
DOP CALC LVOT STROKE VOLUME: 78.3 CM3
DOP CALCLVOT PEAK VEL VTI: 24.69 CM
E WAVE DECELERATION TIME: 380.45 MSEC
E/A RATIO: 1
E/E' RATIO: 9.85
ECHO LV POSTERIOR WALL: 1.56 CM (ref 0.6–1.1)
FRACTIONAL SHORTENING: 37 % (ref 28–44)
INTERVENTRICULAR SEPTUM: 1.6 CM (ref 0.6–1.1)
IVRT: 0.14 MSEC
LA MAJOR: 5.17 CM
LA MINOR: 5.42 CM
LA WIDTH: 4.07 CM
LEFT ATRIUM SIZE: 4.65 CM
LEFT ATRIUM VOLUME INDEX: 38.5 ML/M2
LEFT ATRIUM VOLUME: 85.13 CM3
LEFT INTERNAL DIMENSION IN SYSTOLE: 2.86 CM (ref 2.1–4)
LEFT VENTRICLE DIASTOLIC VOLUME INDEX: 42.75 ML/M2
LEFT VENTRICLE DIASTOLIC VOLUME: 94.57 ML
LEFT VENTRICLE MASS INDEX: 136.8 G/M2
LEFT VENTRICLE SYSTOLIC VOLUME INDEX: 14.1 ML/M2
LEFT VENTRICLE SYSTOLIC VOLUME: 31.26 ML
LEFT VENTRICULAR INTERNAL DIMENSION IN DIASTOLE: 4.54 CM (ref 3.5–6)
LEFT VENTRICULAR MASS: 302.58 G
LV LATERAL E/E' RATIO: 8
LV SEPTAL E/E' RATIO: 12.8
MV PEAK A VEL: 0.64 M/S
MV PEAK E VEL: 0.64 M/S
PISA TR MAX VEL: 2.73 M/S
PULM VEIN S/D RATIO: 1.55
PV PEAK D VEL: 0.29 M/S
PV PEAK S VEL: 0.45 M/S
PV PEAK VELOCITY: 1.73 CM/S
RA MAJOR: 5.54 CM
RA PRESSURE: 3 MMHG
RA WIDTH: 4.31 CM
RIGHT VENTRICULAR END-DIASTOLIC DIMENSION: 4.37 CM
RV TISSUE DOPPLER FREE WALL SYSTOLIC VELOCITY 1 (APICAL 4 CHAMBER VIEW): 19.15 M/S
SINUS: 3.65 CM
STJ: 2.92 CM
TDI LATERAL: 0.08
TDI SEPTAL: 0.05
TDI: 0.07
TR MAX PG: 29.81 MMHG
TRICUSPID ANNULAR PLANE SYSTOLIC EXCURSION: 3.08 CM
TV REST PULMONARY ARTERY PRESSURE: 33 MMHG

## 2019-05-20 PROCEDURE — 93306 TRANSTHORACIC ECHO (TTE) COMPLETE (CUPID ONLY): ICD-10-PCS | Mod: 26,,, | Performed by: INTERNAL MEDICINE

## 2019-05-20 PROCEDURE — 93306 TTE W/DOPPLER COMPLETE: CPT

## 2019-05-20 PROCEDURE — 93306 TTE W/DOPPLER COMPLETE: CPT | Mod: 26,,, | Performed by: INTERNAL MEDICINE

## 2019-06-10 RX ORDER — LISINOPRIL AND HYDROCHLOROTHIAZIDE 10; 12.5 MG/1; MG/1
1 TABLET ORAL DAILY
Qty: 90 TABLET | Refills: 3 | Status: SHIPPED | OUTPATIENT
Start: 2019-06-10

## 2019-06-10 NOTE — TELEPHONE ENCOUNTER
----- Message from Mak Adam sent at 6/10/2019 12:30 PM CDT -----  Contact: Steven [phone]  Type: RX Refill Request    Who Called: Steven    Refill or New Rx:Refill     RX Name and Strength:lisinopril-hydrochlorothiazide (PRINZIDE,ZESTORETIC) 10-12.5 mg per tablet    Is this a 30 day or 90 day RX:90 DAY    Preferred [facility] with phone number[facility]    Would the patient rather a call back or a response via [facility]? Call back    Best Call Back Number:[phone]

## 2019-09-02 ENCOUNTER — HOSPITAL ENCOUNTER (EMERGENCY)
Facility: HOSPITAL | Age: 64
Discharge: HOME | End: 2019-09-02
Attending: EMERGENCY MEDICINE
Payer: MEDICARE

## 2019-09-02 VITALS
HEART RATE: 73 BPM | BODY MASS INDEX: 32.58 KG/M2 | OXYGEN SATURATION: 96 % | HEIGHT: 69 IN | WEIGHT: 220 LBS | DIASTOLIC BLOOD PRESSURE: 90 MMHG | SYSTOLIC BLOOD PRESSURE: 168 MMHG | RESPIRATION RATE: 17 BRPM | TEMPERATURE: 99 F

## 2019-09-02 DIAGNOSIS — K08.89 PAIN, DENTAL: ICD-10-CM

## 2019-09-02 DIAGNOSIS — K04.01 ACUTE PULPITIS: Primary | ICD-10-CM

## 2019-09-02 PROCEDURE — 99284 EMERGENCY DEPT VISIT MOD MDM: CPT

## 2019-09-02 PROCEDURE — 25000003 PHARM REV CODE 250: Performed by: NURSE PRACTITIONER

## 2019-09-02 RX ORDER — NAPROXEN 500 MG/1
500 TABLET ORAL 2 TIMES DAILY WITH MEALS
Qty: 10 TABLET | Refills: 0 | Status: SHIPPED | OUTPATIENT
Start: 2019-09-02 | End: 2019-09-07

## 2019-09-02 RX ORDER — CLINDAMYCIN HYDROCHLORIDE 150 MG/1
450 CAPSULE ORAL 4 TIMES DAILY
Qty: 84 CAPSULE | Refills: 0 | Status: SHIPPED | OUTPATIENT
Start: 2019-09-03 | End: 2019-09-10

## 2019-09-02 RX ORDER — CHLORHEXIDINE GLUCONATE ORAL RINSE 1.2 MG/ML
15 SOLUTION DENTAL 2 TIMES DAILY
Qty: 118 ML | Refills: 0 | Status: SHIPPED | OUTPATIENT
Start: 2019-09-02 | End: 2019-09-16

## 2019-09-02 RX ORDER — CLINDAMYCIN HYDROCHLORIDE 150 MG/1
450 CAPSULE ORAL
Status: COMPLETED | OUTPATIENT
Start: 2019-09-02 | End: 2019-09-02

## 2019-09-02 RX ORDER — NAPROXEN 500 MG/1
500 TABLET ORAL
Status: COMPLETED | OUTPATIENT
Start: 2019-09-02 | End: 2019-09-02

## 2019-09-02 RX ORDER — HYDROCODONE BITARTRATE AND ACETAMINOPHEN 5; 325 MG/1; MG/1
1 TABLET ORAL EVERY 4 HOURS PRN
Qty: 10 TABLET | Refills: 0 | Status: SHIPPED | OUTPATIENT
Start: 2019-09-02

## 2019-09-02 RX ADMIN — CLINDAMYCIN HYDROCHLORIDE 450 MG: 150 CAPSULE ORAL at 04:09

## 2019-09-02 RX ADMIN — NAPROXEN 500 MG: 500 TABLET ORAL at 04:09

## 2019-09-02 NOTE — ED PROVIDER NOTES
Encounter Date: 9/2/2019    SCRIBE #1 NOTE: I, Marycruz Padilla, am scribing for, and in the presence of,  Siria Oquendo NP. I have scribed the following portions of the note - Other sections scribed: HPI, ROS.       History     Chief Complaint   Patient presents with    Facial Swelling     Left side facial swelling due to bad tooth, reports going to dentist about a week ago and prescribed abx, but has not gotten any better. Rates pain 7/10, airway patent in NA.     CC: Facial Swelling    HPI: This is a 63 y.o. M who has HTN who presents to the ED for emergent evaluation of left sided facial swelling x 1 week. Facial swelling has gradually worsened since onset, which prompted today's ED visit.  No OTC treatment attempted PTA. He reports that he seen at a dental office 1 week ago, which he had a X-ray done at that visit. Pt was prescribed a 7 day course of penicillin for a possible abscess. He did not have an abscess drained or any other dental procedures during his dental visit. He completed taking antibiotics today. He has no known drug allergies. Pt denies fever, chills, nausea, vomiting, diarrhea, or tobacco use.    The history is provided by the patient. No  was used.     Review of patient's allergies indicates:  No Known Allergies  Past Medical History:   Diagnosis Date    Hypertension      History reviewed. No pertinent surgical history.  History reviewed. No pertinent family history.  Social History     Tobacco Use    Smoking status: Never Smoker    Smokeless tobacco: Never Used   Substance Use Topics    Alcohol use: Yes     Comment: Occasionally     Drug use: No     Review of Systems   Constitutional: Negative for chills and fever.   HENT: Positive for ear pain (left) and facial swelling (left). Negative for sore throat.    Respiratory: Negative for shortness of breath.    Cardiovascular: Negative for chest pain.   Gastrointestinal: Negative for diarrhea, nausea and vomiting.    Genitourinary: Negative for dysuria.   Musculoskeletal: Negative for back pain.   Skin: Negative for rash.   Neurological: Negative for dizziness, weakness and headaches.   Hematological: Does not bruise/bleed easily.       Physical Exam     Initial Vitals [09/02/19 1516]   BP Pulse Resp Temp SpO2   (!) 178/90 80 18 99 °F (37.2 °C) 97 %      MAP       --         Physical Exam    Vitals reviewed.  Constitutional: He appears well-developed and well-nourished. He is not diaphoretic.  Non-toxic appearance. He does not have a sickly appearance. He does not appear ill. No distress.   HENT:   Head: Normocephalic and atraumatic. Head is without right periorbital erythema and without left periorbital erythema.   Right Ear: Hearing, tympanic membrane, external ear and ear canal normal. No mastoid tenderness.   Left Ear: Hearing, tympanic membrane, external ear and ear canal normal. No mastoid tenderness.   Nose: Nose normal.   Mouth/Throat: Oropharynx is clear and moist and mucous membranes are normal. No trismus in the jaw. Normal dentition. Dental caries present. No dental abscesses or uvula swelling. No oropharyngeal exudate.       Eyes: Conjunctivae and EOM are normal. Pupils are equal, round, and reactive to light.   Neck: Trachea normal, normal range of motion, full passive range of motion without pain and phonation normal. Neck supple. No thyroid mass and no thyromegaly present.   Cardiovascular: Normal rate, regular rhythm, normal heart sounds and intact distal pulses.   Pulmonary/Chest: No respiratory distress.   Musculoskeletal: Normal range of motion.   Neurological: He is alert and oriented to person, place, and time. GCS eye subscore is 4. GCS verbal subscore is 5. GCS motor subscore is 6.   Skin: Skin is warm, dry and intact. No rash noted. No erythema.   Psychiatric: He has a normal mood and affect. Thought content normal.         ED Course   Procedures  Labs Reviewed - No data to display       Imaging  Results    None          Medical Decision Making:   ED Management:  This is an evaluation of a 63 y.o. male that presents to the Emergency Department for dental pain with associated left sided facial swelling. The patient reports no fever, chills, nausea, vomiting, or inability to open mouth. Physical Exam shows a non-toxic, afebrile, and well appearing male with overall poor dentition. There is mild left-sided facial swelling with possibly a small 3 mm by 3 mm area of induration to anterior maxillary region below the infraorbital foramen. There is no obvious visible oral drainable abscess at this time. He has no facial cellulitis, oral swelling, airway compromise, sublingual swelling, or trismus. There is gingival erythema on the facial and lingual surfaces surrounding the tooth # 9.The neck is soft and supple.     Vital signs are reassuring.  Discussed with patient possibility of very small abscess versus induration.  Discussed risks and benefits of I and D at this time versus wait and see with more aggressive antibiotic coverage.  Will attempt outpatient treatment with more aggressive antibiotic coverage.  Thoroughly discuss return precautions.  Patient verbalized understanding and agrees with the plan of care.    I considered, but at this time, do not suspect Rigoberto's angina, deep space infection, peritonsillar infection, pharyngitis, mastoiditis, or a facial cellulitis.    ED Course:  Clindamycin, Naprosyn. D/C Meds:  Clindamycin, Naprosyn, Peridex, Norco. The diagnosis, treatment plan, instructions for follow-up and reevaluation with a dentist as well as ED return precautions were discussed and understanding was verbalized. All questions or concerns have been addressed.     This case was discussed with and the patient has been examined by Dr. Lopez who is in agreement with my assessment and plan.              Attending Attestation:   Physician Attestation Statement for Resident:  As the supervising MD    Physician Attestation Statement: I have personally seen and examined this patient.   I agree with the above history. -: 63 y.o. male recently seen by dentist for odontogenic infection and started on pcn, notes worsening swelling despite pcn, no f/c, n/v.   As the supervising MD I agree with the above PE.   -: Swelling L face with approx 3mm x 3mm area of induration, no real fluctuance   As the supervising MD I agree with the above treatment, course, plan, and disposition.   -: I have d/w pt that it is unclear if he has a tiny abscess vs induration from his infection. I have d/w pt that risks of attempted drainage far outweigh benefits at this time.  Will plan to start on more broad spectrum abx than pcn, nsaids, ice, return precautions, f/u dental                 IJONY, personally performed the services described in this documentation. All medical record entries made by the scribe were at my direction and in my presence. I have reviewed the chart and agree that the record reflects my personal performance and is accurate and complete.        Clinical Impression:       ICD-10-CM ICD-9-CM   1. Acute pulpitis K04.01 522.0   2. Pain, dental K08.89 525.9         Disposition:   Disposition: Discharged  Condition: Stable                        Siria Oquendo NP  09/02/19 2650       Te Lopez MD  09/02/19 7699

## 2019-09-02 NOTE — DISCHARGE INSTRUCTIONS
Thank you for coming to our Emergency Department today. It is important to remember that some problems are difficult to diagnose and may not be found during your first visit. Be sure to follow up with your primary care doctor.    Return to the ER with any questions/concerns, new/concerning symptoms, worsening or failure to improve. Do not drive or make any important decisions for 24 hours if you have received any pain medications, sedatives or mood altering drugs during your ER visit.     Please follow up with a dentist as soon as possible for reevaluation of symptoms, if you do not have one you can follow up with one of the dental clinics from the list that will given to you with your discharge instructions.    Please take all your antibiotics as prescribed even if your are feeling better or your symptoms resolve.    Please return to the ER for any worsening symptoms including: fever, facial swelling/redness, difficulty opening your mouth/breathing/swallowing or new symptoms or other concerns.     You have been prescribed NORCO for pain. Please do not take this medication while working, drinking alcohol, swimming, or while driving/operating heavy machinery. This medication may cause drowsiness, impair judgment, and reduce physical capabilities. This medication contains Tylenol. Please do not take any additional Tylenol while you are taking this medication.    You have been prescribed Naproxen for pain. This is an Non-Steroidal Anti-Inflammatory (NSAID) Medication. Please do not take any additional NSAIDs while you are taking this medication including (Advil, Aleve, Motrin, Ibuprofen, Mobic\meloxicam, Naprosyn, etc.). Please stop taking this medication if you experience: weakness, itching, yellow skin or eyes, joint pains, vomiting blood, blood or black stools, unusual weight gain, or swelling in your arms, legs, hands, or feet.

## 2019-09-02 NOTE — ED TRIAGE NOTES
Pt cc Facial Swelling Pt reports Left side facial swelling due to bad tooth, reports going to dentist about a week ago and prescribed abx, but has not gotten any better. Rates pain 7/10, airway patent in NA

## 2020-05-09 ENCOUNTER — RESEARCH ENCOUNTER (OUTPATIENT)
Dept: RESEARCH | Facility: HOSPITAL | Age: 65
End: 2020-05-09

## 2024-04-30 ENCOUNTER — LAB VISIT (OUTPATIENT)
Dept: LAB | Facility: HOSPITAL | Age: 69
End: 2024-04-30
Attending: STUDENT IN AN ORGANIZED HEALTH CARE EDUCATION/TRAINING PROGRAM
Payer: MEDICARE

## 2024-04-30 DIAGNOSIS — N39.0 URINARY TRACT INFECTION, SITE NOT SPECIFIED: Primary | ICD-10-CM

## 2024-04-30 LAB
ALBUMIN SERPL BCP-MCNC: 2.6 G/DL (ref 3.5–5.2)
ALP SERPL-CCNC: 79 U/L (ref 55–135)
ALT SERPL W/O P-5'-P-CCNC: 16 U/L (ref 10–44)
ANION GAP SERPL CALC-SCNC: 8 MMOL/L (ref 8–16)
AST SERPL-CCNC: 20 U/L (ref 10–40)
BASOPHILS # BLD AUTO: 0.03 K/UL (ref 0–0.2)
BASOPHILS NFR BLD: 0.6 % (ref 0–1.9)
BILIRUB SERPL-MCNC: 0.4 MG/DL (ref 0.1–1)
BUN SERPL-MCNC: 20 MG/DL (ref 8–23)
CALCIUM SERPL-MCNC: 8.7 MG/DL (ref 8.7–10.5)
CHLORIDE SERPL-SCNC: 107 MMOL/L (ref 95–110)
CO2 SERPL-SCNC: 24 MMOL/L (ref 23–29)
CREAT SERPL-MCNC: 0.8 MG/DL (ref 0.5–1.4)
DIFFERENTIAL METHOD BLD: ABNORMAL
EOSINOPHIL # BLD AUTO: 0.2 K/UL (ref 0–0.5)
EOSINOPHIL NFR BLD: 4.7 % (ref 0–8)
ERYTHROCYTE [DISTWIDTH] IN BLOOD BY AUTOMATED COUNT: 17.2 % (ref 11.5–14.5)
EST. GFR  (NO RACE VARIABLE): >60 ML/MIN/1.73 M^2
GLUCOSE SERPL-MCNC: 107 MG/DL (ref 70–110)
HCT VFR BLD AUTO: 33.2 % (ref 40–54)
HGB BLD-MCNC: 10.5 G/DL (ref 14–18)
IMM GRANULOCYTES # BLD AUTO: 0.02 K/UL (ref 0–0.04)
IMM GRANULOCYTES NFR BLD AUTO: 0.4 % (ref 0–0.5)
LYMPHOCYTES # BLD AUTO: 0.8 K/UL (ref 1–4.8)
LYMPHOCYTES NFR BLD: 16.6 % (ref 18–48)
MCH RBC QN AUTO: 30.4 PG (ref 27–31)
MCHC RBC AUTO-ENTMCNC: 31.6 G/DL (ref 32–36)
MCV RBC AUTO: 96 FL (ref 82–98)
MONOCYTES # BLD AUTO: 0.6 K/UL (ref 0.3–1)
MONOCYTES NFR BLD: 13.6 % (ref 4–15)
NEUTROPHILS # BLD AUTO: 3 K/UL (ref 1.8–7.7)
NEUTROPHILS NFR BLD: 64.1 % (ref 38–73)
NRBC BLD-RTO: 0 /100 WBC
PLATELET # BLD AUTO: 201 K/UL (ref 150–450)
PMV BLD AUTO: 10.2 FL (ref 9.2–12.9)
POTASSIUM SERPL-SCNC: 2.9 MMOL/L (ref 3.5–5.1)
PROT SERPL-MCNC: 5.8 G/DL (ref 6–8.4)
RBC # BLD AUTO: 3.45 M/UL (ref 4.6–6.2)
SODIUM SERPL-SCNC: 139 MMOL/L (ref 136–145)
WBC # BLD AUTO: 4.71 K/UL (ref 3.9–12.7)

## 2024-04-30 PROCEDURE — 80053 COMPREHEN METABOLIC PANEL: CPT | Performed by: STUDENT IN AN ORGANIZED HEALTH CARE EDUCATION/TRAINING PROGRAM

## 2024-04-30 PROCEDURE — 85025 COMPLETE CBC W/AUTO DIFF WBC: CPT | Performed by: STUDENT IN AN ORGANIZED HEALTH CARE EDUCATION/TRAINING PROGRAM

## 2024-06-19 ENCOUNTER — HOSPITAL ENCOUNTER (INPATIENT)
Facility: HOSPITAL | Age: 69
LOS: 1 days | Discharge: HOME | DRG: 862 | End: 2024-06-21
Attending: EMERGENCY MEDICINE | Admitting: STUDENT IN AN ORGANIZED HEALTH CARE EDUCATION/TRAINING PROGRAM
Payer: MEDICARE

## 2024-06-19 DIAGNOSIS — L03.115 CELLULITIS OF RIGHT LOWER EXTREMITY: ICD-10-CM

## 2024-06-19 DIAGNOSIS — R50.9 FEVER: Primary | ICD-10-CM

## 2024-06-19 DIAGNOSIS — T81.49XA POSTOPERATIVE CELLULITIS OF SURGICAL WOUND: ICD-10-CM

## 2024-06-19 DIAGNOSIS — R07.9 CHEST PAIN: ICD-10-CM

## 2024-06-19 PROBLEM — N40.0 BENIGN PROSTATIC HYPERPLASIA: Status: ACTIVE | Noted: 2018-11-09

## 2024-06-19 PROBLEM — C49.21 MALIGNANT NEOPLASM OF CONNECTIVE AND SOFT TISSUE OF RIGHT LOWER LIMB, INCLUDING HIP: Status: ACTIVE | Noted: 2023-10-17

## 2024-06-19 PROBLEM — G89.18 POST-OP PAIN: Status: ACTIVE | Noted: 2024-06-12

## 2024-06-19 PROBLEM — D64.9 NORMOCYTIC ANEMIA: Status: ACTIVE | Noted: 2024-06-19

## 2024-06-19 PROBLEM — A41.9 SEPSIS: Status: ACTIVE | Noted: 2024-06-19

## 2024-06-19 PROBLEM — Z86.711 HISTORY OF PULMONARY EMBOLISM: Status: ACTIVE | Noted: 2024-01-09

## 2024-06-19 PROBLEM — D72.829 LEUKOCYTOSIS: Status: ACTIVE | Noted: 2023-12-15

## 2024-06-19 PROBLEM — I26.99 PULMONARY EMBOLISM: Status: ACTIVE | Noted: 2024-01-09

## 2024-06-19 PROBLEM — Z98.61 CAD S/P PERCUTANEOUS CORONARY ANGIOPLASTY: Status: ACTIVE | Noted: 2018-11-09

## 2024-06-19 PROBLEM — E78.5 HYPERLIPIDEMIA: Status: ACTIVE | Noted: 2018-11-09

## 2024-06-19 PROBLEM — E87.6 HYPOKALEMIA: Status: ACTIVE | Noted: 2024-06-19

## 2024-06-19 PROBLEM — I25.10 CAD S/P PERCUTANEOUS CORONARY ANGIOPLASTY: Status: ACTIVE | Noted: 2018-11-09

## 2024-06-19 LAB
ALBUMIN SERPL BCP-MCNC: 3 G/DL (ref 3.5–5.2)
ALLENS TEST: NORMAL
ALP SERPL-CCNC: 71 U/L (ref 55–135)
ALT SERPL W/O P-5'-P-CCNC: 10 U/L (ref 10–44)
ANION GAP SERPL CALC-SCNC: 9 MMOL/L (ref 8–16)
AST SERPL-CCNC: 13 U/L (ref 10–40)
BASOPHILS # BLD AUTO: 0.03 K/UL (ref 0–0.2)
BASOPHILS NFR BLD: 0.2 % (ref 0–1.9)
BILIRUB SERPL-MCNC: 0.8 MG/DL (ref 0.1–1)
BILIRUB UR QL STRIP: NEGATIVE
BNP SERPL-MCNC: 237 PG/ML (ref 0–99)
BUN SERPL-MCNC: 22 MG/DL (ref 8–23)
CALCIUM SERPL-MCNC: 9.1 MG/DL (ref 8.7–10.5)
CHLORIDE SERPL-SCNC: 101 MMOL/L (ref 95–110)
CLARITY UR REFRACT.AUTO: CLEAR
CO2 SERPL-SCNC: 27 MMOL/L (ref 23–29)
COLOR UR AUTO: YELLOW
CREAT SERPL-MCNC: 1 MG/DL (ref 0.5–1.4)
CRP SERPL-MCNC: 129.5 MG/L (ref 0–8.2)
DIFFERENTIAL METHOD BLD: ABNORMAL
EOSINOPHIL # BLD AUTO: 0 K/UL (ref 0–0.5)
EOSINOPHIL NFR BLD: 0 % (ref 0–8)
ERYTHROCYTE [DISTWIDTH] IN BLOOD BY AUTOMATED COUNT: 15.5 % (ref 11.5–14.5)
EST. GFR  (NO RACE VARIABLE): >60 ML/MIN/1.73 M^2
GLUCOSE SERPL-MCNC: 133 MG/DL (ref 70–110)
GLUCOSE UR QL STRIP: NEGATIVE
HCT VFR BLD AUTO: 33.1 % (ref 40–54)
HCV AB SERPL QL IA: NORMAL
HGB BLD-MCNC: 10.6 G/DL (ref 14–18)
HGB UR QL STRIP: NEGATIVE
HIV 1+2 AB+HIV1 P24 AG SERPL QL IA: NORMAL
IMM GRANULOCYTES # BLD AUTO: 0.06 K/UL (ref 0–0.04)
IMM GRANULOCYTES NFR BLD AUTO: 0.4 % (ref 0–0.5)
INFLUENZA A, MOLECULAR: NOT DETECTED
INFLUENZA B, MOLECULAR: NOT DETECTED
KETONES UR QL STRIP: NEGATIVE
LACTATE SERPL-SCNC: 1.4 MMOL/L (ref 0.5–2.2)
LDH SERPL L TO P-CCNC: 2.02 MMOL/L (ref 0.5–2.2)
LEUKOCYTE ESTERASE UR QL STRIP: NEGATIVE
LYMPHOCYTES # BLD AUTO: 0.7 K/UL (ref 1–4.8)
LYMPHOCYTES NFR BLD: 5 % (ref 18–48)
MCH RBC QN AUTO: 29.9 PG (ref 27–31)
MCHC RBC AUTO-ENTMCNC: 32 G/DL (ref 32–36)
MCV RBC AUTO: 94 FL (ref 82–98)
MONOCYTES # BLD AUTO: 1.2 K/UL (ref 0.3–1)
MONOCYTES NFR BLD: 8.4 % (ref 4–15)
NEUTROPHILS # BLD AUTO: 11.9 K/UL (ref 1.8–7.7)
NEUTROPHILS NFR BLD: 86 % (ref 38–73)
NITRITE UR QL STRIP: NEGATIVE
NRBC BLD-RTO: 0 /100 WBC
OHS QRS DURATION: 88 MS
OHS QTC CALCULATION: 520 MS
PH UR STRIP: 6 [PH] (ref 5–8)
PLATELET # BLD AUTO: 261 K/UL (ref 150–450)
PMV BLD AUTO: 9.6 FL (ref 9.2–12.9)
POTASSIUM SERPL-SCNC: 2.9 MMOL/L (ref 3.5–5.1)
PROCALCITONIN SERPL IA-MCNC: 0.2 NG/ML
PROT SERPL-MCNC: 6 G/DL (ref 6–8.4)
PROT UR QL STRIP: ABNORMAL
RBC # BLD AUTO: 3.54 M/UL (ref 4.6–6.2)
RSV AG BY MOLECULAR METHOD: NOT DETECTED
SAMPLE: NORMAL
SARS-COV-2 RNA RESP QL NAA+PROBE: NOT DETECTED
SITE: NORMAL
SODIUM SERPL-SCNC: 137 MMOL/L (ref 136–145)
SP GR UR STRIP: >1.03 (ref 1–1.03)
URN SPEC COLLECT METH UR: ABNORMAL
WBC # BLD AUTO: 13.88 K/UL (ref 3.9–12.7)

## 2024-06-19 PROCEDURE — 25000003 PHARM REV CODE 250: Performed by: EMERGENCY MEDICINE

## 2024-06-19 PROCEDURE — 84145 PROCALCITONIN (PCT): CPT | Performed by: EMERGENCY MEDICINE

## 2024-06-19 PROCEDURE — 99900035 HC TECH TIME PER 15 MIN (STAT)

## 2024-06-19 PROCEDURE — G0378 HOSPITAL OBSERVATION PER HR: HCPCS

## 2024-06-19 PROCEDURE — 83605 ASSAY OF LACTIC ACID: CPT

## 2024-06-19 PROCEDURE — 0241U SARS-COV2 (COVID) WITH FLU/RSV BY PCR: CPT | Performed by: EMERGENCY MEDICINE

## 2024-06-19 PROCEDURE — 93010 ELECTROCARDIOGRAM REPORT: CPT | Mod: ,,, | Performed by: INTERNAL MEDICINE

## 2024-06-19 PROCEDURE — 83605 ASSAY OF LACTIC ACID: CPT | Performed by: STUDENT IN AN ORGANIZED HEALTH CARE EDUCATION/TRAINING PROGRAM

## 2024-06-19 PROCEDURE — 25500020 PHARM REV CODE 255: Performed by: STUDENT IN AN ORGANIZED HEALTH CARE EDUCATION/TRAINING PROGRAM

## 2024-06-19 PROCEDURE — 63600175 PHARM REV CODE 636 W HCPCS: Performed by: STUDENT IN AN ORGANIZED HEALTH CARE EDUCATION/TRAINING PROGRAM

## 2024-06-19 PROCEDURE — 25000003 PHARM REV CODE 250: Performed by: STUDENT IN AN ORGANIZED HEALTH CARE EDUCATION/TRAINING PROGRAM

## 2024-06-19 PROCEDURE — 85025 COMPLETE CBC W/AUTO DIFF WBC: CPT | Performed by: EMERGENCY MEDICINE

## 2024-06-19 PROCEDURE — 87389 HIV-1 AG W/HIV-1&-2 AB AG IA: CPT | Performed by: PHYSICIAN ASSISTANT

## 2024-06-19 PROCEDURE — 87040 BLOOD CULTURE FOR BACTERIA: CPT | Performed by: EMERGENCY MEDICINE

## 2024-06-19 PROCEDURE — 81003 URINALYSIS AUTO W/O SCOPE: CPT | Performed by: EMERGENCY MEDICINE

## 2024-06-19 PROCEDURE — 99285 EMERGENCY DEPT VISIT HI MDM: CPT | Mod: 25

## 2024-06-19 PROCEDURE — 86140 C-REACTIVE PROTEIN: CPT | Performed by: EMERGENCY MEDICINE

## 2024-06-19 PROCEDURE — 83880 ASSAY OF NATRIURETIC PEPTIDE: CPT | Performed by: STUDENT IN AN ORGANIZED HEALTH CARE EDUCATION/TRAINING PROGRAM

## 2024-06-19 PROCEDURE — 86803 HEPATITIS C AB TEST: CPT | Performed by: PHYSICIAN ASSISTANT

## 2024-06-19 PROCEDURE — 63600175 PHARM REV CODE 636 W HCPCS: Performed by: EMERGENCY MEDICINE

## 2024-06-19 PROCEDURE — 96365 THER/PROPH/DIAG IV INF INIT: CPT

## 2024-06-19 PROCEDURE — 96361 HYDRATE IV INFUSION ADD-ON: CPT

## 2024-06-19 PROCEDURE — 93005 ELECTROCARDIOGRAM TRACING: CPT

## 2024-06-19 PROCEDURE — 80053 COMPREHEN METABOLIC PANEL: CPT | Performed by: EMERGENCY MEDICINE

## 2024-06-19 PROCEDURE — 96375 TX/PRO/DX INJ NEW DRUG ADDON: CPT

## 2024-06-19 RX ORDER — IBUPROFEN 200 MG
24 TABLET ORAL
Status: DISCONTINUED | OUTPATIENT
Start: 2024-06-19 | End: 2024-06-21 | Stop reason: HOSPADM

## 2024-06-19 RX ORDER — ATORVASTATIN CALCIUM 40 MG/1
80 TABLET, FILM COATED ORAL DAILY
Status: DISCONTINUED | OUTPATIENT
Start: 2024-06-20 | End: 2024-06-21 | Stop reason: HOSPADM

## 2024-06-19 RX ORDER — ONDANSETRON HYDROCHLORIDE 2 MG/ML
4 INJECTION, SOLUTION INTRAVENOUS EVERY 6 HOURS PRN
Status: DISCONTINUED | OUTPATIENT
Start: 2024-06-19 | End: 2024-06-19

## 2024-06-19 RX ORDER — HYDRALAZINE HYDROCHLORIDE 20 MG/ML
10 INJECTION INTRAMUSCULAR; INTRAVENOUS EVERY 6 HOURS PRN
Status: DISCONTINUED | OUTPATIENT
Start: 2024-06-19 | End: 2024-06-21 | Stop reason: HOSPADM

## 2024-06-19 RX ORDER — NALOXONE HCL 0.4 MG/ML
0.02 VIAL (ML) INJECTION
Status: DISCONTINUED | OUTPATIENT
Start: 2024-06-19 | End: 2024-06-21 | Stop reason: HOSPADM

## 2024-06-19 RX ORDER — MORPHINE SULFATE 4 MG/ML
4 INJECTION, SOLUTION INTRAMUSCULAR; INTRAVENOUS
Status: COMPLETED | OUTPATIENT
Start: 2024-06-19 | End: 2024-06-19

## 2024-06-19 RX ORDER — ACETAMINOPHEN 500 MG
1000 TABLET ORAL
Status: COMPLETED | OUTPATIENT
Start: 2024-06-19 | End: 2024-06-19

## 2024-06-19 RX ORDER — GABAPENTIN 100 MG/1
100 CAPSULE ORAL 2 TIMES DAILY
COMMUNITY

## 2024-06-19 RX ORDER — HYDROCODONE BITARTRATE AND ACETAMINOPHEN 10; 325 MG/1; MG/1
1 TABLET ORAL EVERY 6 HOURS PRN
Status: DISCONTINUED | OUTPATIENT
Start: 2024-06-19 | End: 2024-06-21 | Stop reason: HOSPADM

## 2024-06-19 RX ORDER — MORPHINE SULFATE 4 MG/ML
3 INJECTION, SOLUTION INTRAMUSCULAR; INTRAVENOUS EVERY 4 HOURS PRN
Status: DISCONTINUED | OUTPATIENT
Start: 2024-06-19 | End: 2024-06-21 | Stop reason: HOSPADM

## 2024-06-19 RX ORDER — IBUPROFEN 200 MG
16 TABLET ORAL
Status: DISCONTINUED | OUTPATIENT
Start: 2024-06-19 | End: 2024-06-21 | Stop reason: HOSPADM

## 2024-06-19 RX ORDER — ACETAMINOPHEN 325 MG/1
650 TABLET ORAL EVERY 6 HOURS PRN
Status: DISCONTINUED | OUTPATIENT
Start: 2024-06-19 | End: 2024-06-21 | Stop reason: HOSPADM

## 2024-06-19 RX ORDER — PROCHLORPERAZINE EDISYLATE 5 MG/ML
2.5 INJECTION INTRAMUSCULAR; INTRAVENOUS EVERY 6 HOURS PRN
Status: DISCONTINUED | OUTPATIENT
Start: 2024-06-19 | End: 2024-06-21 | Stop reason: HOSPADM

## 2024-06-19 RX ORDER — SODIUM CHLORIDE 0.9 % (FLUSH) 0.9 %
10 SYRINGE (ML) INJECTION
Status: DISCONTINUED | OUTPATIENT
Start: 2024-06-19 | End: 2024-06-21 | Stop reason: HOSPADM

## 2024-06-19 RX ORDER — POTASSIUM CHLORIDE 20 MEQ/1
40 TABLET, EXTENDED RELEASE ORAL ONCE
Status: COMPLETED | OUTPATIENT
Start: 2024-06-19 | End: 2024-06-19

## 2024-06-19 RX ORDER — GLUCAGON 1 MG
1 KIT INJECTION
Status: DISCONTINUED | OUTPATIENT
Start: 2024-06-19 | End: 2024-06-21 | Stop reason: HOSPADM

## 2024-06-19 RX ORDER — SODIUM CHLORIDE 0.9 % (FLUSH) 0.9 %
10 SYRINGE (ML) INJECTION EVERY 12 HOURS PRN
Status: DISCONTINUED | OUTPATIENT
Start: 2024-06-19 | End: 2024-06-21 | Stop reason: HOSPADM

## 2024-06-19 RX ORDER — METHOCARBAMOL 500 MG/1
500 TABLET, FILM COATED ORAL EVERY 8 HOURS PRN
COMMUNITY

## 2024-06-19 RX ORDER — CEFADROXIL 500 MG/1
500 CAPSULE ORAL EVERY 12 HOURS
Status: ON HOLD | COMMUNITY
End: 2024-06-21 | Stop reason: HOSPADM

## 2024-06-19 RX ORDER — FLUTICASONE PROPIONATE 50 MCG
2 SPRAY, SUSPENSION (ML) NASAL DAILY
Status: DISCONTINUED | OUTPATIENT
Start: 2024-06-20 | End: 2024-06-21 | Stop reason: HOSPADM

## 2024-06-19 RX ORDER — TALC
6 POWDER (GRAM) TOPICAL NIGHTLY PRN
Status: DISCONTINUED | OUTPATIENT
Start: 2024-06-19 | End: 2024-06-21 | Stop reason: HOSPADM

## 2024-06-19 RX ADMIN — ACETAMINOPHEN 1000 MG: 500 TABLET ORAL at 11:06

## 2024-06-19 RX ADMIN — IOHEXOL 100 ML: 350 INJECTION, SOLUTION INTRAVENOUS at 04:06

## 2024-06-19 RX ADMIN — PIPERACILLIN SODIUM AND TAZOBACTAM SODIUM 4.5 G: 4; .5 INJECTION, POWDER, FOR SOLUTION INTRAVENOUS at 12:06

## 2024-06-19 RX ADMIN — POTASSIUM BICARBONATE 25 MEQ: 978 TABLET, EFFERVESCENT ORAL at 12:06

## 2024-06-19 RX ADMIN — SODIUM CHLORIDE, POTASSIUM CHLORIDE, SODIUM LACTATE AND CALCIUM CHLORIDE 1000 ML: 600; 310; 30; 20 INJECTION, SOLUTION INTRAVENOUS at 11:06

## 2024-06-19 RX ADMIN — APIXABAN 5 MG: 5 TABLET, FILM COATED ORAL at 09:06

## 2024-06-19 RX ADMIN — MORPHINE SULFATE 4 MG: 4 INJECTION INTRAVENOUS at 12:06

## 2024-06-19 RX ADMIN — HYDROCODONE BITARTRATE AND ACETAMINOPHEN 1 TABLET: 10; 325 TABLET ORAL at 03:06

## 2024-06-19 RX ADMIN — MORPHINE SULFATE 3 MG: 4 INJECTION INTRAVENOUS at 04:06

## 2024-06-19 RX ADMIN — MORPHINE SULFATE 3 MG: 4 INJECTION INTRAVENOUS at 08:06

## 2024-06-19 RX ADMIN — VANCOMYCIN HYDROCHLORIDE 2000 MG: 500 INJECTION, POWDER, LYOPHILIZED, FOR SOLUTION INTRAVENOUS at 01:06

## 2024-06-19 RX ADMIN — POTASSIUM CHLORIDE 40 MEQ: 1500 TABLET, EXTENDED RELEASE ORAL at 03:06

## 2024-06-19 RX ADMIN — CEFEPIME 2 G: 2 INJECTION, POWDER, FOR SOLUTION INTRAVENOUS at 09:06

## 2024-06-19 NOTE — SUBJECTIVE & OBJECTIVE
No current facility-administered medications on file prior to encounter.     Current Outpatient Medications on File Prior to Encounter   Medication Sig    aspirin (ECOTRIN) 81 MG EC tablet Take 1 tablet (81 mg total) by mouth once daily.    atorvastatin (LIPITOR) 80 MG tablet Take 1 tablet (80 mg total) by mouth once daily.    CIALIS 20 mg Tab     clopidogrel (PLAVIX) 75 mg tablet Take 1 tablet (75 mg total) by mouth once daily.    fluticasone (FLONASE) 50 mcg/actuation nasal spray 2 sprays by Each Nare route once daily.    HYDROcodone-acetaminophen (NORCO) 5-325 mg per tablet Take 1 tablet by mouth every 4 (four) hours as needed for Pain.    lisinopril-hydrochlorothiazide (PRINZIDE,ZESTORETIC) 10-12.5 mg per tablet Take 1 tablet by mouth once daily.    metoprolol succinate (TOPROL-XL) 100 MG 24 hr tablet Take 1 tablet (100 mg total) by mouth once daily.    terbinafine HCl (LAMISIL) 250 mg tablet Take 250 mg by mouth once daily.       Review of patient's allergies indicates:  No Known Allergies    Past Medical History:   Diagnosis Date    Hypertension      No past surgical history on file.  Family History    None       Tobacco Use    Smoking status: Never    Smokeless tobacco: Never   Substance and Sexual Activity    Alcohol use: Yes     Comment: Occasionally     Drug use: No    Sexual activity: Not on file     Review of Systems   Constitutional:  Positive for chills and fever.   Respiratory:  Negative for chest tightness and shortness of breath.    Cardiovascular:  Negative for chest pain and palpitations.   Gastrointestinal:  Negative for nausea and vomiting.   Skin:  Negative for color change and pallor.   Neurological:  Negative for seizures and numbness.     Objective:     Vital Signs (Most Recent):  Temp: 98.7 °F (37.1 °C) (06/19/24 1356)  Pulse: 70 (06/19/24 1529)  Resp: 20 (06/19/24 1539)  BP: (!) 101/55 (06/19/24 1502)  SpO2: 98 % (06/19/24 1529) Vital Signs (24h Range):  Temp:  [98.7 °F (37.1 °C)-99.9 °F  (37.7 °C)] 98.7 °F (37.1 °C)  Pulse:  [70-81] 70  Resp:  [12-20] 20  SpO2:  [92 %-98 %] 98 %  BP: ()/(45-57) 101/55     Weight: 105.2 kg (232 lb)  Body mass index is 34.26 kg/m².     Physical Exam  Constitutional:       General: He is not in acute distress.     Appearance: Normal appearance.   HENT:      Head: Normocephalic and atraumatic.      Right Ear: Tympanic membrane normal.      Left Ear: Tympanic membrane normal.      Mouth/Throat:      Mouth: Mucous membranes are moist.   Eyes:      Extraocular Movements: Extraocular movements intact.      Conjunctiva/sclera: Conjunctivae normal.   Cardiovascular:      Rate and Rhythm: Normal rate and regular rhythm.   Pulmonary:      Effort: Pulmonary effort is normal.   Abdominal:      General: Abdomen is flat. There is no distension.      Palpations: Abdomen is soft.      Tenderness: There is no abdominal tenderness.   Musculoskeletal:         General: Normal range of motion.      Cervical back: Normal range of motion.   Skin:     General: Skin is warm and dry.      Comments: Incision extending from right posterior buttock to right lateral thigh c/d/I. Surrounding erythema and induration but no noted fluctuance.   Drain to right buttock   Neurological:      General: No focal deficit present.      Mental Status: He is alert and oriented to person, place, and time.   Psychiatric:         Mood and Affect: Mood normal.         Behavior: Behavior normal.            I have reviewed all pertinent lab results within the past 24 hours.    Significant Diagnostics:  I have reviewed all pertinent imaging results/findings within the past 24 hours.

## 2024-06-19 NOTE — ASSESSMENT & PLAN NOTE
-SIRS criteria positive with fever and leukocytosis  -Noted hypotension    -My overall impression is sepsis.  Source:  Unknown source at this time.  Chest x-ray not consistent with pneumonia  -follow up urinalysis  -taking into account patient has severe right hip pain, concern for postop complication.  Follow up right hip CT    -patient given Zosyn and vancomycin in the ED.  -we will continue vancomycin for now and give cefepime    Latest lactate reviewed-  Recent Labs   Lab 06/19/24  1142   POCLAC 2.02     No organ dysfunction indicated  Given IV fluids in the ED.  We will likely continue IV fluids    Will Not start Pressors- Levophed for MAP of 65

## 2024-06-19 NOTE — ASSESSMENT & PLAN NOTE
-Patient's anemia is currently controlled. Has not received any PRBCs to date.   -hemoglobin appears consistent with prior value.  Suspect due to chronic inflammation from malignancy and possibly some postsurgical blood loss.  Current CBC reviewed-   Lab Results   Component Value Date    HGB 10.6 (L) 06/19/2024    HCT 33.1 (L) 06/19/2024     Monitor serial CBC and transfuse if patient becomes hemodynamically unstable, symptomatic or H/H drops below 7/21.

## 2024-06-19 NOTE — ED PROVIDER NOTES
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  With a fever he has had some shaking chills but no other new symptoms.  He has had some persistent pain at his surgical site over the last few weeks.  He does have persistent fluid output through his LAURA drain but does not note any difference in the appearance of this fluid or the quantity.  He does not know increased pain at the surgical site over the past day.  No URI symptoms, no vomiting diarrhea, no abdominal pain.  Patient states that he had a follow up visit at [facility] 2 days ago.  He had a right lower extremity Doppler at that time that was negative for DVT as well as an x-ray of the right femur which was unremarkable.  When he was seen 2 days ago, it also looks like they started him on a cephalosporin antibiotic, unclear why.    The history is provided by the patient.     Review of patient's allergies indicates:  No Known Allergies  Past Medical History:   Diagnosis Date    Hypertension      No past surgical history on file.  No family history on file.  Social History     Tobacco Use    Smoking status: Never    Smokeless tobacco: Never   Substance Use Topics    Alcohol use: Yes     Comment: Occasionally     Drug use: No     Review of Systems    Physical Exam     Initial Vitals [06/19/24 0913]   BP Pulse Resp Temp SpO2   (!) 108/57 81 20 99.9 °F (37.7 °C) 97 %      MAP       --         Physical Exam    Nursing note and vitals reviewed.  Constitutional: Vital signs are normal. He appears well-developed and well-nourished. He is not diaphoretic.  Non-toxic appearance. He does not appear ill.  No distress.   HENT:   Head: Normocephalic and atraumatic.   Mouth/Throat: Mucous membranes are normal. Mucous membranes are not dry.   Eyes: Conjunctivae and lids are normal.   Neck: Neck supple.   Normal range of motion.  Cardiovascular:  Normal rate.           Pulmonary/Chest: No respiratory distress.   Abdominal: Abdomen is soft. He exhibits no distension. There is no abdominal tenderness. There is no rebound and no guarding.   Musculoskeletal:      Cervical back: Normal range of motion and neck supple.      Comments: R LE:  Large vertical incision site noted from right hip down to right lower thigh extending along the lateral aspect of the thigh.  There is no drainage from this wound, there is no erythema, significant tenderness or induration noted.  At the lower aspect of the surgical incision site there is a LAURA drain in place and there is some mildly cloudy serosanguineous fluid in the collection bulb.     Neurological: He is alert and oriented to person, place, and time.   Skin: Skin is dry and intact. No pallor.   Psychiatric: He has a normal mood and affect. His speech is normal and behavior is normal.         ED Course   Procedures  Labs Reviewed   CBC W/ AUTO DIFFERENTIAL - Abnormal; Notable for the following components:       Result Value    WBC 13.88 (*)     RBC 3.54 (*)     Hemoglobin 10.6 (*)     Hematocrit 33.1 (*)     RDW 15.5 (*)     Gran # (ANC) 11.9 (*)     Immature Grans (Abs) 0.06 (*)     Lymph # 0.7 (*)     Mono # 1.2 (*)     Gran % 86.0 (*)     Lymph % 5.0 (*)     All other components within normal limits   COMPREHENSIVE METABOLIC PANEL - Abnormal; Notable for the following components:    Potassium 2.9 (*)     Glucose 133 (*)     Albumin 3.0 (*)     All other components within normal limits   CULTURE, BLOOD   CULTURE, BLOOD   HIV 1 / 2 ANTIBODY    Narrative:     Release to patient->Immediate   HEPATITIS C ANTIBODY    Narrative:     Release to patient->Immediate   PROCALCITONIN   SARS-COV2  (COVID) WITH FLU/RSV BY PCR   C-REACTIVE PROTEIN   URINALYSIS, REFLEX TO URINE CULTURE   LACTIC ACID, PLASMA   B-TYPE NATRIURETIC PEPTIDE   ISTAT LACTATE        ECG Results              EKG 12-lead (Final result)        Collection Time Result Time QRS Duration OHS QTC Calculation    06/19/24 11:12:19 06/19/24 13:48:35 88 520                     Final result by Interface, Lab In [facility] (06/19/24 13:48:42)                   Narrative:    Test Reason : R50.9,    Vent. Rate : 089 BPM     Atrial Rate : 089 BPM     P-R Int : 154 ms          QRS Dur : 088 ms      QT Int : 428 ms       P-R-T Axes : 035 -05 008 degrees     QTc Int : 520 ms    Sinus rhythm with occasional Premature ventricular complexes and Premature  atrial complexes  Moderate voltage criteria for LVH, may be normal variant  Prolonged QT  Abnormal ECG  When compared with ECG of 16-MAY-2019 09:38,  voltage criteria for LVH  Premature ventricular complexes are now Present  Premature atrial complexes are now Present  T wave inversion now evident in Inferior leads  QT has lengthened  Confirmed by PETRA SONG MD ([id number]) on 6/19/2024 1:48:33 PM    Referred By: [email or web address]   SELF           Confirmed By:PETRA SONG MD                                  Imaging Results              X-Ray Chest AP Portable (Final result)  Result time 06/19/24 13:47:29      Final result by Kristian Veras MD (06/19/24 13:47:29)                   Impression:      1. Interstitial findings may reflect edema or other nonspecific pneumonitis.  No large focal consolidation.      Electronically signed by: Kristian Veras MD  Date:    06/19/2024  Time:    13:47               Narrative:    EXAMINATION:  XR CHEST AP PORTABLE    CLINICAL HISTORY:  Sepsis;    TECHNIQUE:  Single frontal view of the chest was performed.    COMPARISON:  02/01/2016    FINDINGS:  Right chest wall port catheter tip projects over the distal SVC.  The cardiomediastinal silhouette is not enlarged noting calcification  of the aorta..  There is no pleural effusion.  The trachea is midline.  The lungs are symmetrically expanded bilaterally with coarse interstitial attenuation bilaterally suggesting edema.  No large focal consolidation seen.  There is no pneumothorax.  The osseous structures are remarkable for degenerative change..                                       Medications   vancomycin 2 g in dextrose 5 % 500 mL IVPB (2,000 mg Intravenous New Bag 6/19/24 1356)   sodium chloride 0.9% flush 10 mL (has no administration in time range)   melatonin tablet 6 mg (has no administration in time range)   atorvastatin tablet 80 mg (has no administration in time range)   fluticasone propionate 50 mcg/actuation nasal spray 100 mcg (has no administration in time range)   sodium chloride 0.9% flush 10 mL (has no administration in time range)   naloxone 0.4 mg/mL injection 0.02 mg (has no administration in time range)   glucose chewable tablet 16 g (has no administration in time range)   glucose chewable tablet 24 g (has no administration in time range)   glucagon (human recombinant) injection 1 mg (has no administration in time range)   dextrose 10% bolus 125 mL 125 mL (has no administration in time range)   dextrose 10% bolus 250 mL 250 mL (has no administration in time range)   potassium chloride SA CR tablet 40 mEq (has no administration in time range)   HYDROcodone-acetaminophen  mg per tablet 1 tablet (has no administration in time range)   morphine injection 3 mg (has no administration in time range)   lactated ringers bolus 1,000 mL (0 mLs Intravenous Stopped 6/19/24 1431)   acetaminophen tablet 1,000 mg (1,000 mg Oral Given 6/19/24 1141)   morphine injection 4 mg (4 mg Intravenous Given 6/19/24 1231)   potassium bicarbonate disintegrating tablet 25 mEq (25 mEq Oral Given 6/19/24 1245)   piperacillin-tazobactam (ZOSYN) 4.5 g in dextrose 5 % in water (D5W) 100 mL IVPB (MB+) (0 g Intravenous Stopped 6/19/24 1323)     Medical  Decision Making  In this patient with a fever, I am concerned about ruling out a serious infection and/or sepsis.  In terms of a source, possibilities include but are not limited to a UTI/pyelonephritis, pneumonia, skin/soft tissue infection, bacteremia, intra-abdominal process, discitis/osteomyelitis, septic arthritis, and endocarditis.  A viral illness like influenza, COVID is also possible.    The following tests will be ordered in order to determine the source of the patient's infection:  -Labs: blood cultures, CBC, CMP, UA, viral testing  -Imaging studies: CXR.  May get CT R thigh if no source of infection identified  -Empiric antibiotics will not be started prior to determining source of the infection.  -IVF    -Will plan for frequent VS checks, close monitoring    -Disposition: admission      Amount and/or Complexity of Data Reviewed  Labs: ordered. Decision-making details documented in ED Course.  Radiology: ordered.    Risk  OTC drugs.  Prescription drug management.  Decision regarding hospitalization.              Attending Attestation:         Attending Critical Care:   Critical Care Times:   ==============================================================  Total Critical Care Time - exclusive of procedural time: 30 minutes.  ==============================================================  Critical care was necessary to treat or prevent imminent or life-threatening deterioration of the following conditions: sepsis.   Critical care was time spent personally by me on the following activities: obtaining history from patient or relative, examination of patient, review of old charts, review of x-rays / CT sent with the patient, ordering lab, x-rays, and/or EKG, development of treatment plan with patient or relative, re-evaluation of patient's conition, evaluation of patient's response to treatment and ordering and performing treatments and interventions.   Critical Care Condition: potentially life-threatening            ED Course as of 06/19/24 1523   Wed Jun 19, 2024   1151 POC Lactate: 2.02 [AS]   1151 WBC(!): 13.88 [AS]   1321 Procalcitonin: 0.20 [AS]   1321 SARS-CoV2 (COVID-19) Qualitative PCR: Not Detected [AS]   1321 Influenza A, Molecular: Not Detected [AS]   1321 Influenza B, Molecular: Not Detected [AS]   1321 RSV Ag by Molecular Method: Not Detected [AS]   1321 Still awaiting urinalysis but no source of fever identified thus far.  Given patient's significant comorbidities, presence of port, will treat empirically with broad-spectrum IV antibiotics and admit for further workup of fever source.  He remains well-appearing clinically and hemodynamically stable. [AS]      ED Course User Index  [AS] Hodan Clayton MD                           Clinical Impression:  Final diagnoses:  [R50.9] Fever (Primary)          ED Disposition Condition    Observation Stable                Hodan Clayton MD  06/19/24 1523

## 2024-06-19 NOTE — ASSESSMENT & PLAN NOTE
-Chronic, controlled. Latest blood pressure and vitals reviewed-     Temp:  [98.7 °F (37.1 °C)-99.9 °F (37.7 °C)]   Pulse:  [70-81]   Resp:  [12-20]   BP: ()/(45-57)   SpO2:  [92 %-98 %] .   Home meds for hypertension were reviewed and noted below.   Hypertension Medications               lisinopril-hydrochlorothiazide (PRINZIDE,ZESTORETIC) 10-12.5 mg per tablet Take 1 tablet by mouth once daily.    metoprolol succinate (TOPROL-XL) 100 MG 24 hr tablet Take 1 tablet (100 mg total) by mouth once daily.     -While in the hospital, will manage blood pressure as follows; hold patient's home antihypertensives for sepsis  -PRN IV Hydralazine

## 2024-06-19 NOTE — PROGRESS NOTES
"Pharmacokinetic Initial Assessment: IV Vancomycin    Assessment/Plan:    Initiate intravenous vancomycin with loading dose of 2000 mg once in the ED followed by a maintenance dose of vancomycin 1500 mg IV every 12 hours given adequate renal function.  Desired empiric serum trough concentration is 10 to 20 mcg/mL for sepsis.  Draw vancomycin trough level 60 min prior to fourth dose on 6/21 at approximately 0000.  If the renal function worsens on 6/20 with AM labs, please draw a random level to assess adjustments to the regimen.  Pharmacy will continue to follow and monitor vancomycin.      Please contact pharmacy at extension [phone] with any questions regarding this assessment.     Thank you for the consult,   Marysol Gallo, InocenteD       Patient brief summary:  Steven Sommers is a 68 y.o. male initiated on antimicrobial therapy with IV Vancomycin for treatment of suspected sepsis    Drug Allergies:   Review of patient's allergies indicates:  No Known Allergies    Actual Body Weight:   105.2 kg    Renal Function:   Estimated Creatinine Clearance: 82.2 mL/min (based on SCr of 1 mg/dL).,     Dialysis Method (if applicable):  N/A    CBC (last 72 hours):  Recent Labs   Lab Result Units 06/19/24  1128   WBC K/uL 13.88*   Hemoglobin g/dL 10.6*   Hematocrit % 33.1*   Platelets K/uL 261   Gran % % 86.0*   Lymph % % 5.0*   Mono % % 8.4   Eosinophil % % 0.0   Basophil % % 0.2   Differential Method  Automated       Metabolic Panel (last 72 hours):  Recent Labs   Lab Result Units 06/19/24  1128   Sodium mmol/L 137   Potassium mmol/L 2.9*   Chloride mmol/L 101   CO2 mmol/L 27   Glucose mg/dL 133*   BUN mg/dL 22   Creatinine mg/dL 1.0   Albumin g/dL 3.0*   Total Bilirubin mg/dL 0.8   Alkaline Phosphatase U/L 71   AST U/L 13   ALT U/L 10       Drug levels (last 3 results):  No results for input(s): "VANCOMYCINRA", "VANCORANDOM", "VANCOMYCINPE", "VANCOPEAK", "VANCOMYCINTR", "VANCOTROUGH" in the last 72 " hours.    Microbiologic Results:  Microbiology Results (last 7 days)       Procedure Component Value Units Date/Time    Blood culture x two cultures. Draw prior to antibiotics. [[id number]] Collected: 06/19/24 1130    Order Status: Sent Specimen: Blood from Peripheral, Antecubital, Right Updated: 06/19/24 1139    Blood culture x two cultures. Draw prior to antibiotics. [[id number]] Collected: 06/19/24 1130    Order Status: Sent Specimen: Blood from Peripheral, Antecubital, Right Updated: 06/19/24 1139

## 2024-06-19 NOTE — ASSESSMENT & PLAN NOTE
-Patient has hypokalemia which is Acute on Chronic and currently uncontrolled. Most recent potassium levels reviewed-   Lab Results   Component Value Date    K 2.9 (L) 06/19/2024   . Will continue potassium replacement per protocol and recheck repeat levels after replacement completed.

## 2024-06-19 NOTE — SUBJECTIVE & OBJECTIVE
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    Review of patient's allergies indicates:  No Known Allergies    No current facility-administered medications on file prior to encounter.     Current Outpatient Medications on File Prior to Encounter   Medication Sig    aspirin (ECOTRIN) 81 MG EC tablet Take 1 tablet (81 mg total) by mouth once daily.    atorvastatin (LIPITOR) 80 MG tablet Take 1 tablet (80 mg total) by mouth once daily.    CIALIS 20 mg Tab     clopidogrel (PLAVIX) 75 mg tablet Take 1 tablet (75 mg total) by mouth once daily.    fluticasone (FLONASE) 50 mcg/actuation nasal spray 2 sprays by Each Nare route once daily.    HYDROcodone-acetaminophen (NORCO) 5-325 mg per tablet Take 1 tablet by mouth every 4 (four) hours as needed for Pain.    lisinopril-hydrochlorothiazide (PRINZIDE,ZESTORETIC) 10-12.5 mg per tablet Take 1 tablet by mouth once daily.    metoprolol succinate (TOPROL-XL) 100 MG 24 hr tablet Take 1 tablet (100 mg total) by mouth once daily.    terbinafine HCl (LAMISIL) 250 mg tablet Take 250 mg by mouth once daily.     Family History    None       Tobacco Use    Smoking status: Never    Smokeless tobacco: Never   Substance and Sexual Activity    Alcohol use: Yes     Comment: Occasionally     Drug use: No    Sexual activity: Not on file     Review of Systems  A comprehensive review of systems was negative except as noted above.    Objective:     Vital Signs (Most Recent):  Temp: 98.7 °F (37.1 °C) (06/19/24 1356)  Pulse: 70 (06/19/24 1529)  Resp: 20 (06/19/24 1539)  BP: (!) 101/55 (06/19/24 1502)  SpO2: 98 % (06/19/24 1529) Vital Signs (24h Range):  Temp:  [98.7 °F (37.1 °C)-99.9 °F (37.7 °C)] 98.7 °F (37.1 °C)  Pulse:  [70-81] 70  Resp:  [12-20] 20  SpO2:  [92 %-98 %] 98 %  BP: ()/(45-57) 101/55      Weight: 105.2 kg (232 lb)  Body mass index is 34.26 kg/m².     Physical Exam  Vitals reviewed.  Nursing notes reviewed  GEN: NAD   HEENT: Normocephalic, atraumatic. PERRLA, EOMI  CV: RRR, no murmurs/rubs/gallops  1+ pitting edema bilateral shins  Lungs: CTAB, no rubs/rhonchi/rales, non-labored breathing on room air  Abd: soft, non-tender to palpation. No guarding  Msk:  Limited range of motion right hip due to pain  Skin: Skin of bilat LE is warm to touch  Lateral Right hip has large well healing skin lesion from gluteal area down to mid thigh. NO active drainage.   Wound drain in place right thigh  Neuro: Alert and oriented x3. Weak RLE. Otherwise no Focal neurologic deficits               Significant Labs: All pertinent labs within the past 24 hours have been reviewed.  CBC:   Recent Labs   Lab 06/19/24  1128   WBC 13.88*   HGB 10.6*   HCT 33.1*        CMP:   Recent Labs   Lab 06/19/24  1128      K 2.9*      CO2 27   *   BUN 22   CREATININE 1.0   CALCIUM 9.1   PROT 6.0   ALBUMIN 3.0*   BILITOT 0.8   ALKPHOS 71   AST 13   ALT 10   ANIONGAP 9       Significant Imaging: I have reviewed all pertinent imaging results/findings within the past 24 hours.

## 2024-06-19 NOTE — ASSESSMENT & PLAN NOTE
-patient reports history of stenting  -continue high-intensity statin  -hold aspirin and Plavix for now

## 2024-06-19 NOTE — H&P
[facility] - Emergency Dept  [facility] Medicine  History & Physical    Patient Name: Steven Sommers  MRN: [id number]  Patient Class: OP- Observation  Admission Date: 6/19/2024  Attending Physician: Viky Cantu MD  Primary Care Provider: Hai Fitzgerald MD         Patient information was obtained from patient, past medical records, and ER records. + ED provider, patient's significant other at bedside    Subjective:     Principal Problem:Sepsis    Chief Complaint:   Chief Complaint   Patient presents with    Post-op Problem     Fever last night, 101.7 post op sarcoma removal 3 weeks ago, not on chemo anymore, drain in place        HPI: 68-year-old male with a history of hypertension, pulmonary embolism?, right pelvis sarcoma s/p neoadjuvant chemotherapy followed by a resection done at [facility] on May 28th, who presents to the ED with chief complaint of fever/chills.  Patient has had right thigh drain in place since surgery, has had reasonably robust fluid output.  Recently had clinic follow up at [facility] on 06/17.  Provider at that visit had concern for RLE pain at calf and right lower leg swelling.  X-ray of right pelvis and femur were performed, with no concerning findings.  Patient states he also had a scan for DVT but I cannot see this in records.  Patient takes Norco 5 mg at home for postsurgical pain.  He states his pain has not really been well controlled over the past few weeks.  Pain is worse in right low back and right lower posterior thigh.  He noticed over the past few days pain has gotten more severe.  Yesterday patient had acute onset of new fever and chills. Last night patient's T-max was 101.9°. With a fever he has had some shaking chills but no other new symptoms.  He does not know increased pain at the surgical site over the past day. No URI symptoms, no vomiting diarrhea, no abdominal pain.  No urinary complaints.  Patient has noticed bilateral lower extremity edema for a few  weeks.    Patient presented to the ED with temperature 99.9°, /57 and O2 sats 97% on room air.  Labs showed WBC elevated 13.8 with hemoglobin 10.6.  Patient treated in the ED with 1 L IV LR, IV morphine, IV Zosyn and vancomycin.            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    Review of patient's allergies indicates:  No Known Allergies    No current facility-administered medications on file prior to encounter.     Current Outpatient Medications on File Prior to Encounter   Medication Sig    aspirin (ECOTRIN) 81 MG EC tablet Take 1 tablet (81 mg total) by mouth once daily.    atorvastatin (LIPITOR) 80 MG tablet Take 1 tablet (80 mg total) by mouth once daily.    CIALIS 20 mg Tab     clopidogrel (PLAVIX) 75 mg tablet Take 1 tablet (75 mg total) by mouth once daily.    fluticasone (FLONASE) 50 mcg/actuation nasal spray 2 sprays by Each Nare route once daily.    HYDROcodone-acetaminophen (NORCO) 5-325 mg per tablet Take 1 tablet by mouth every 4 (four) hours as needed for Pain.    lisinopril-hydrochlorothiazide (PRINZIDE,ZESTORETIC) 10-12.5 mg per tablet Take 1 tablet by mouth once daily.    metoprolol succinate (TOPROL-XL) 100 MG 24 hr tablet Take 1 tablet (100 mg total) by mouth once daily.    terbinafine HCl (LAMISIL) 250 mg tablet Take 250 mg by mouth once daily.     Family History    None       Tobacco Use    Smoking status: Never    Smokeless tobacco: Never   Substance and Sexual Activity    Alcohol use: Yes     Comment: Occasionally     Drug use: No    Sexual activity: Not on file     Review of Systems  A comprehensive review of systems was negative except as noted above.    Objective:     Vital Signs (Most Recent):  Temp: 98.7 °F (37.1 °C) (06/19/24 1356)  Pulse: 70 (06/19/24 1529)  Resp: 20 (06/19/24 1539)  BP:  (!) 101/55 (06/19/24 1502)  SpO2: 98 % (06/19/24 1529) Vital Signs (24h Range):  Temp:  [98.7 °F (37.1 °C)-99.9 °F (37.7 °C)] 98.7 °F (37.1 °C)  Pulse:  [70-81] 70  Resp:  [12-20] 20  SpO2:  [92 %-98 %] 98 %  BP: ()/(45-57) 101/55     Weight: 105.2 kg (232 lb)  Body mass index is 34.26 kg/m².     Physical Exam  Vitals reviewed.  Nursing notes reviewed  GEN: NAD   HEENT: Normocephalic, atraumatic. PERRLA, EOMI  CV: RRR, no murmurs/rubs/gallops  1+ pitting edema bilateral shins  Lungs: CTAB, no rubs/rhonchi/rales, non-labored breathing on room air  Abd: soft, non-tender to palpation. No guarding  Msk:  Limited range of motion right hip due to pain  Skin: Skin of bilat LE is warm to touch  Lateral Right hip has large well healing skin lesion from gluteal area down to mid thigh. NO active drainage.   Wound drain in place right thigh  Neuro: Alert and oriented x3. Weak RLE. Otherwise no Focal neurologic deficits               Significant Labs: All pertinent labs within the past 24 hours have been reviewed.  CBC:   Recent Labs   Lab 06/19/24  1128   WBC 13.88*   HGB 10.6*   HCT 33.1*        CMP:   Recent Labs   Lab 06/19/24  1128      K 2.9*      CO2 27   *   BUN 22   CREATININE 1.0   CALCIUM 9.1   PROT 6.0   ALBUMIN 3.0*   BILITOT 0.8   ALKPHOS 71   AST 13   ALT 10   ANIONGAP 9       Significant Imaging: I have reviewed all pertinent imaging results/findings within the past 24 hours.  Assessment/Plan:     * Sepsis  -SIRS criteria positive with fever and leukocytosis  -Noted hypotension    -My overall impression is sepsis.  Source:  Unknown source at this time.  Chest x-ray not consistent with pneumonia  -follow up urinalysis  -taking into account patient has severe right hip pain, concern for postop complication.  Follow up right hip CT    -patient given Zosyn and vancomycin in the ED.  -we will continue vancomycin for now and give cefepime    Latest lactate reviewed-  Recent Labs   Lab  06/19/24  1142   POCLAC 2.02     No organ dysfunction indicated  Given IV fluids in the ED.  We will likely continue IV fluids    Will Not start Pressors- Levophed for MAP of 65      Leukocytosis  -See above      Hypokalemia  -Patient has hypokalemia which is Acute on Chronic and currently uncontrolled. Most recent potassium levels reviewed-   Lab Results   Component Value Date    K 2.9 (L) 06/19/2024   . Will continue potassium replacement per protocol and recheck repeat levels after replacement completed.     Normocytic anemia  -Patient's anemia is currently controlled. Has not received any PRBCs to date.   -hemoglobin appears consistent with prior value.  Suspect due to chronic inflammation from malignancy and possibly some postsurgical blood loss.  Current CBC reviewed-   Lab Results   Component Value Date    HGB 10.6 (L) 06/19/2024    HCT 33.1 (L) 06/19/2024     Monitor serial CBC and transfuse if patient becomes hemodynamically unstable, symptomatic or H/H drops below 7/21.    History of pulmonary embolism  1/8/24. Incidental note of Right lower lobe segmental pulmonary embolism.   -Patient on eliquis at home  -hold Eliquis for now in case patient may need surgery but can restart soon      CAD S/P percutaneous coronary angioplasty  -patient reports history of stenting  -continue high-intensity statin  -hold aspirin and Plavix for now    Essential hypertension  -Chronic, controlled. Latest blood pressure and vitals reviewed-     Temp:  [98.7 °F (37.1 °C)-99.9 °F (37.7 °C)]   Pulse:  [70-81]   Resp:  [12-20]   BP: ()/(45-57)   SpO2:  [92 %-98 %] .   Home meds for hypertension were reviewed and noted below.   Hypertension Medications               lisinopril-hydrochlorothiazide (PRINZIDE,ZESTORETIC) 10-12.5 mg per tablet Take 1 tablet by mouth once daily.    metoprolol succinate (TOPROL-XL) 100 MG 24 hr tablet Take 1 tablet (100 mg total) by mouth once daily.     -While in the hospital, will manage blood  "pressure as follows; hold patient's home antihypertensives for sepsis  -PRN IV Hydralazine      VTE Risk Mitigation (From admission, onward)           Ordered     IP VTE HIGH RISK PATIENT  Once         06/19/24 1508     Place sequential compression device  Until discontinued         06/19/24 1508     Reason for no Mechanical VTE Prophylaxis  Once        Comments: Will order   Question:  Reasons:  Answer:  Physician Provided (leave comment)    06/19/24 1508     Reason for No Pharmacological VTE Prophylaxis  Once        Question:  Reasons:  Answer:  Risk of Bleeding    06/19/24 1508                       On 06/19/2024, patient should be placed in hospital observation services under my care.      Viky Cantu MD  Department of Hospital Medicine  [facility]  06/19/2024      *Portions of this note may have been created with voice recognition software. Occasional "wrong word "or "sound alike" substitutions may have occurred due to the inherent limitations of voice recognition software.  Please excuse errors. Please, read the note carefully and recognize, using context, where substitutions have occurred.            "

## 2024-06-19 NOTE — ED NOTES
Patient had recent sarcoma removed at [facility]; completed chemo in May and radiation.  Started with fever up to 101.9 last night. Denies cough, n/v/d, CP, SOB. States the drain in his right leg sometimes slips out and he pushes it back in place to help the drainage flow.  Also reports bilateral leg swelling.

## 2024-06-19 NOTE — PHARMACY MED REC
"    Admission Medication History     The home medication history was taken by Ester Trent.    You may go to "Admission" then "Reconcile Home Medications" tabs to review and/or act upon these items.     The home medication list has been updated by the Pharmacy department.   Please read ALL comments highlighted in yellow.   Please address this information as you see fit.    Feel free to contact us if you have any questions or require assistance.      The medications listed below were removed from the home medication list. Please reorder if appropriate:  Patient reports no longer taking the following medication(s):  CIALIS 20 MG TABLET  CLOPIDOGREL 75 MG TABLET  FLUTICASONE 50 MCG NASAL SPRAY  TERBINAFINE 250 MG TABLET    Medications listed below were obtained from: Patient/family and Analytic software- [employer]  Current Outpatient Medications on File Prior to Encounter   Medication Sig    apixaban (ELIQUIS) 2.5 mg Tab   Take 5 mg by mouth 2 (two) times daily.    atorvastatin (LIPITOR) 80 MG tablet   Take 1 tablet (80 mg total) by mouth once daily.    cefadroxil (DURICEF) 500 MG Cap   Take 500 mg by mouth every 12 (twelve) hours.    gabapentin (NEURONTIN) 100 MG capsule   Take 100 mg by mouth 2 (two) times daily.    HYDROcodone-acetaminophen (NORCO) 5-325 mg per tablet   Take 1 tablet by mouth every 4 (four) hours as needed for pain.    lisinopril-hydrochlorothiazide (PRINZIDE,ZESTORETIC) 10-12.5 mg per tablet   Take 1 tablet by mouth once daily.    methocarbamoL (ROBAXIN) 500 MG Tab   Take 500 mg by mouth every 8 (eight) hours as needed for as needed for muscle spasms.    metoprolol succinate (TOPROL-XL) 100 MG 24 hr tablet   Take 1 tablet (100 mg total) by mouth once daily.    aspirin (ECOTRIN) 81 MG EC tablet   Take 1 tablet (81 mg total) by mouth once daily. (Patient not taking: Reported on 6/19/2024)           Potential issues to be addressed PRIOR TO DISCHARGE  Please discuss with the patient barriers to " adherence with medication treatment plans  Patient requires education regarding drug therapies     Ester Trent  EXT [id number]              .

## 2024-06-19 NOTE — HPI
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 He underwent radical resection at [facility] on 5/28/24. He iintially had been recovering well, however, he developed worsening pain at the surgical site over the past 2 days and spiked a fever to 101.7F last night so he decided to present to the ED. Of note, a drain was placed at time of surgery and per patient has had serous to serosanguinous output. Denies noting any purulent output. The incision is c/d/I, however, there is some surrounding induration and erythema. He did have a follow-up appointment at [facility] 2 days ago and was started on antibiotics at that time. Surgical Oncology was consulted for wound evaluation.

## 2024-06-19 NOTE — ASSESSMENT & PLAN NOTE
1/8/24. Incidental note of Right lower lobe segmental pulmonary embolism.   -Patient on eliquis at home  -hold Eliquis for now in case patient may need surgery but can restart soon

## 2024-06-19 NOTE — ASSESSMENT & PLAN NOTE
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 He underwent radical resection at [facility] on 5/28/24 and presented to the ED today after increased surgical site pain x2d and fever. Minimal erythema and induration noted around incision.     - CT scan without fluid collection in surgical site  - No surgical intervention indicated a this time.   - Recommend IV antibiotics  - Recommend continuing to monitor drain output  - Rest of care per primary team

## 2024-06-19 NOTE — ASSESSMENT & PLAN NOTE
Cellulitis of leg  Postoperative cellulitis of surgical wound    SIRS criteria positive on admission with fever and leukocytosis with hypotension      My overall impression is sepsis.  Source:  Cellulitis that is surgical wound site  - Procalcitonin 2.02 on admission  - Chest x-ray not consistent with pneumonia  - UA  - Right hip CT with postsurgical changes   - Blood cultures NGTD  - ID consulted  -- Continue cefepime and vancomycin

## 2024-06-19 NOTE — HPI
68-year-old male with a history of hypertension, pulmonary embolism?, right pelvis sarcoma s/p neoadjuvant chemotherapy followed by a resection done at [facility] on May 28th, who presents to the ED with chief complaint of fever/chills.  Patient has had right thigh drain in place since surgery, has had reasonably robust fluid output.  Recently had clinic follow up at [facility] on 06/17.  Provider at that visit had concern for RLE pain at calf and right lower leg swelling.  X-ray of right pelvis and femur were performed, with no concerning findings.  Patient states he also had a scan for DVT but I cannot see this in records.  Patient takes Norco 5 mg at home for postsurgical pain.  He states his pain has not really been well controlled over the past few weeks.  Pain is worse in right low back and right lower posterior thigh.  He noticed over the past few days pain has gotten more severe.  Yesterday patient had acute onset of new fever and chills. Last night patient's T-max was 101.9°. With a fever he has had some shaking chills but no other new symptoms.  He does not know increased pain at the surgical site over the past day. No URI symptoms, no vomiting diarrhea, no abdominal pain.  No urinary complaints.  Patient has noticed bilateral lower extremity edema for a few weeks.    Patient presented to the ED with temperature 99.9°, /57 and O2 sats 97% on room air.  Labs showed WBC elevated 13.8 with hemoglobin 10.6.  Patient treated in the ED with 1 L IV LR, IV morphine, IV Zosyn and vancomycin.

## 2024-06-19 NOTE — CONSULTS
[facility] - Emergency Dept  General Surgery  Consult Note    Patient Name: Steven Sommers  MRN: [id number]  Code Status: Full Code  Admission Date: 6/19/2024  Hospital Length of Stay: 0 days  Attending Physician: Viky Cantu MD  Primary Care Provider: Hai Fitzgerald MD    Patient information was obtained from patient and past medical records.     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 He underwent radical resection at [facility] on 5/28/24. He intially had been recovering well, however, he developed worsening pain at the surgical site over the past 2 days and spiked a fever to 101.7F last night so he decided to present to the ED. Of note, a drain was placed at time of surgery and per patient has had serous to serosanguinous output. Denies noting any purulent output. The incision is c/d/I, however, there is some surrounding induration and erythema. He did have a follow-up appointment at [facility] 2 days ago and was started on antibiotics at that time. Surgical Oncology was consulted for wound evaluation.     No current facility-administered medications on file prior to encounter.     Current Outpatient Medications on File Prior to Encounter   Medication Sig    aspirin (ECOTRIN) 81 MG EC tablet Take 1 tablet (81 mg total) by mouth once daily.    atorvastatin (LIPITOR) 80 MG tablet Take 1 tablet (80 mg total) by mouth once daily.    CIALIS 20 mg Tab     clopidogrel (PLAVIX) 75 mg tablet Take 1 tablet (75 mg total) by mouth once daily.    fluticasone (FLONASE) 50 mcg/actuation nasal spray 2 sprays by Each Nare route once daily.    HYDROcodone-acetaminophen (NORCO) 5-325 mg per tablet Take 1 tablet by mouth every 4 (four) hours as needed for Pain.     lisinopril-hydrochlorothiazide (PRINZIDE,ZESTORETIC) 10-12.5 mg per tablet Take 1 tablet by mouth once daily.    metoprolol succinate (TOPROL-XL) 100 MG 24 hr tablet Take 1 tablet (100 mg total) by mouth once daily.    terbinafine HCl (LAMISIL) 250 mg tablet Take 250 mg by mouth once daily.       Review of patient's allergies indicates:  No Known Allergies    Past Medical History:   Diagnosis Date    Hypertension      No past surgical history on file.  Family History    None       Tobacco Use    Smoking status: Never    Smokeless tobacco: Never   Substance and Sexual Activity    Alcohol use: Yes     Comment: Occasionally     Drug use: No    Sexual activity: Not on file     Review of Systems   Constitutional:  Positive for chills and fever.   Respiratory:  Negative for chest tightness and shortness of breath.    Cardiovascular:  Negative for chest pain and palpitations.   Gastrointestinal:  Negative for nausea and vomiting.   Skin:  Negative for color change and pallor.   Neurological:  Negative for seizures and numbness.     Objective:     Vital Signs (Most Recent):  Temp: 98.7 °F (37.1 °C) (06/19/24 1356)  Pulse: 70 (06/19/24 1529)  Resp: 20 (06/19/24 1539)  BP: (!) 101/55 (06/19/24 1502)  SpO2: 98 % (06/19/24 1529) Vital Signs (24h Range):  Temp:  [98.7 °F (37.1 °C)-99.9 °F (37.7 °C)] 98.7 °F (37.1 °C)  Pulse:  [70-81] 70  Resp:  [12-20] 20  SpO2:  [92 %-98 %] 98 %  BP: ()/(45-57) 101/55     Weight: 105.2 kg (232 lb)  Body mass index is 34.26 kg/m².     Physical Exam  Constitutional:       General: He is not in acute distress.     Appearance: Normal appearance.   HENT:      Head: Normocephalic and atraumatic.      Right Ear: Tympanic membrane normal.      Left Ear: Tympanic membrane normal.      Mouth/Throat:      Mouth: Mucous membranes are moist.   Eyes:      Extraocular Movements: Extraocular movements intact.      Conjunctiva/sclera: Conjunctivae normal.   Cardiovascular:      Rate and Rhythm: Normal  rate and regular rhythm.   Pulmonary:      Effort: Pulmonary effort is normal.   Abdominal:      General: Abdomen is flat. There is no distension.      Palpations: Abdomen is soft.      Tenderness: There is no abdominal tenderness.   Musculoskeletal:         General: Normal range of motion.      Cervical back: Normal range of motion.   Skin:     General: Skin is warm and dry.      Comments: Incision extending from right posterior buttock to right lateral thigh c/d/I. Surrounding erythema and induration but no noted fluctuance.   Drain to right buttock   Neurological:      General: No focal deficit present.      Mental Status: He is alert and oriented to person, place, and time.   Psychiatric:         Mood and Affect: Mood normal.         Behavior: Behavior normal.            I have reviewed all pertinent lab results within the past 24 hours.    Significant Diagnostics:  I have reviewed all pertinent imaging results/findings within the past 24 hours.    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 He underwent radical resection at [facility] on 5/28/24 and presented to the ED today after increased surgical site pain x2d and fever. Some erythema and induration noted around incision.     - CT scan without fluid collection in surgical site  - No surgical intervention indicated at this time.   - Recommend IV antibiotics  - Recommend continuing to monitor drain output  - Rest of care per primary team        Mandi Ojeda MD  General Surgery  [facility] - Emergency Dept

## 2024-06-20 PROBLEM — T81.49XA POSTOPERATIVE CELLULITIS OF SURGICAL WOUND: Status: ACTIVE | Noted: 2024-06-20

## 2024-06-20 PROBLEM — L03.119 CELLULITIS OF LEG: Status: ACTIVE | Noted: 2024-06-20

## 2024-06-20 LAB
ANION GAP SERPL CALC-SCNC: 9 MMOL/L (ref 8–16)
BASOPHILS # BLD AUTO: 0.03 K/UL (ref 0–0.2)
BASOPHILS NFR BLD: 0.3 % (ref 0–1.9)
BUN SERPL-MCNC: 18 MG/DL (ref 8–23)
CALCIUM SERPL-MCNC: 8.9 MG/DL (ref 8.7–10.5)
CHLORIDE SERPL-SCNC: 102 MMOL/L (ref 95–110)
CO2 SERPL-SCNC: 27 MMOL/L (ref 23–29)
CREAT SERPL-MCNC: 0.9 MG/DL (ref 0.5–1.4)
DIFFERENTIAL METHOD BLD: ABNORMAL
EOSINOPHIL # BLD AUTO: 0.1 K/UL (ref 0–0.5)
EOSINOPHIL NFR BLD: 1.5 % (ref 0–8)
ERYTHROCYTE [DISTWIDTH] IN BLOOD BY AUTOMATED COUNT: 15.7 % (ref 11.5–14.5)
EST. GFR  (NO RACE VARIABLE): >60 ML/MIN/1.73 M^2
GLUCOSE SERPL-MCNC: 97 MG/DL (ref 70–110)
HCT VFR BLD AUTO: 30.1 % (ref 40–54)
HGB BLD-MCNC: 9.5 G/DL (ref 14–18)
IMM GRANULOCYTES # BLD AUTO: 0.02 K/UL (ref 0–0.04)
IMM GRANULOCYTES NFR BLD AUTO: 0.2 % (ref 0–0.5)
LYMPHOCYTES # BLD AUTO: 0.9 K/UL (ref 1–4.8)
LYMPHOCYTES NFR BLD: 9.6 % (ref 18–48)
MAGNESIUM SERPL-MCNC: 2.1 MG/DL (ref 1.6–2.6)
MCH RBC QN AUTO: 29.9 PG (ref 27–31)
MCHC RBC AUTO-ENTMCNC: 31.6 G/DL (ref 32–36)
MCV RBC AUTO: 95 FL (ref 82–98)
MONOCYTES # BLD AUTO: 0.8 K/UL (ref 0.3–1)
MONOCYTES NFR BLD: 8.8 % (ref 4–15)
MRSA SCREEN BY PCR: NOT DETECTED
NEUTROPHILS # BLD AUTO: 7.1 K/UL (ref 1.8–7.7)
NEUTROPHILS NFR BLD: 79.6 % (ref 38–73)
NRBC BLD-RTO: 0 /100 WBC
PHOSPHATE SERPL-MCNC: 3.1 MG/DL (ref 2.7–4.5)
PLATELET # BLD AUTO: 232 K/UL (ref 150–450)
PMV BLD AUTO: 10 FL (ref 9.2–12.9)
POTASSIUM SERPL-SCNC: 3.1 MMOL/L (ref 3.5–5.1)
RBC # BLD AUTO: 3.18 M/UL (ref 4.6–6.2)
SODIUM SERPL-SCNC: 138 MMOL/L (ref 136–145)
WBC # BLD AUTO: 8.96 K/UL (ref 3.9–12.7)

## 2024-06-20 PROCEDURE — 25000003 PHARM REV CODE 250: Performed by: STUDENT IN AN ORGANIZED HEALTH CARE EDUCATION/TRAINING PROGRAM

## 2024-06-20 PROCEDURE — 84100 ASSAY OF PHOSPHORUS: CPT | Performed by: STUDENT IN AN ORGANIZED HEALTH CARE EDUCATION/TRAINING PROGRAM

## 2024-06-20 PROCEDURE — 99222 1ST HOSP IP/OBS MODERATE 55: CPT | Mod: GC,,, | Performed by: INTERNAL MEDICINE

## 2024-06-20 PROCEDURE — 25000003 PHARM REV CODE 250: Performed by: EMERGENCY MEDICINE

## 2024-06-20 PROCEDURE — 21400001 HC TELEMETRY ROOM

## 2024-06-20 PROCEDURE — 94761 N-INVAS EAR/PLS OXIMETRY MLT: CPT

## 2024-06-20 PROCEDURE — 80048 BASIC METABOLIC PNL TOTAL CA: CPT | Performed by: STUDENT IN AN ORGANIZED HEALTH CARE EDUCATION/TRAINING PROGRAM

## 2024-06-20 PROCEDURE — 87641 MR-STAPH DNA AMP PROBE: CPT | Performed by: STUDENT IN AN ORGANIZED HEALTH CARE EDUCATION/TRAINING PROGRAM

## 2024-06-20 PROCEDURE — 83735 ASSAY OF MAGNESIUM: CPT | Performed by: STUDENT IN AN ORGANIZED HEALTH CARE EDUCATION/TRAINING PROGRAM

## 2024-06-20 PROCEDURE — 99222 1ST HOSP IP/OBS MODERATE 55: CPT | Mod: GC,,, | Performed by: SURGERY

## 2024-06-20 PROCEDURE — 36415 COLL VENOUS BLD VENIPUNCTURE: CPT | Performed by: STUDENT IN AN ORGANIZED HEALTH CARE EDUCATION/TRAINING PROGRAM

## 2024-06-20 PROCEDURE — 63600175 PHARM REV CODE 636 W HCPCS: Performed by: STUDENT IN AN ORGANIZED HEALTH CARE EDUCATION/TRAINING PROGRAM

## 2024-06-20 PROCEDURE — 25000242 PHARM REV CODE 250 ALT 637 W/ HCPCS: Performed by: STUDENT IN AN ORGANIZED HEALTH CARE EDUCATION/TRAINING PROGRAM

## 2024-06-20 PROCEDURE — 85025 COMPLETE CBC W/AUTO DIFF WBC: CPT | Performed by: STUDENT IN AN ORGANIZED HEALTH CARE EDUCATION/TRAINING PROGRAM

## 2024-06-20 RX ORDER — ASPIRIN 81 MG/1
81 TABLET ORAL DAILY
Status: DISCONTINUED | OUTPATIENT
Start: 2024-06-21 | End: 2024-06-21 | Stop reason: HOSPADM

## 2024-06-20 RX ORDER — POTASSIUM CHLORIDE 750 MG/1
30 CAPSULE, EXTENDED RELEASE ORAL 2 TIMES DAILY
Status: COMPLETED | OUTPATIENT
Start: 2024-06-20 | End: 2024-06-20

## 2024-06-20 RX ORDER — CLOPIDOGREL BISULFATE 75 MG/1
75 TABLET ORAL DAILY
Status: DISCONTINUED | OUTPATIENT
Start: 2024-06-21 | End: 2024-06-21 | Stop reason: HOSPADM

## 2024-06-20 RX ADMIN — MORPHINE SULFATE 3 MG: 4 INJECTION INTRAVENOUS at 06:06

## 2024-06-20 RX ADMIN — Medication 6 MG: at 09:06

## 2024-06-20 RX ADMIN — HYDROCODONE BITARTRATE AND ACETAMINOPHEN 1 TABLET: 10; 325 TABLET ORAL at 08:06

## 2024-06-20 RX ADMIN — THERA TABS 1 TABLET: TAB at 08:06

## 2024-06-20 RX ADMIN — MORPHINE SULFATE 3 MG: 4 INJECTION INTRAVENOUS at 05:06

## 2024-06-20 RX ADMIN — ATORVASTATIN CALCIUM 80 MG: 40 TABLET, FILM COATED ORAL at 08:06

## 2024-06-20 RX ADMIN — CEFEPIME 2 G: 2 INJECTION, POWDER, FOR SOLUTION INTRAVENOUS at 09:06

## 2024-06-20 RX ADMIN — HYDROCODONE BITARTRATE AND ACETAMINOPHEN 1 TABLET: 10; 325 TABLET ORAL at 03:06

## 2024-06-20 RX ADMIN — MORPHINE SULFATE 3 MG: 4 INJECTION INTRAVENOUS at 10:06

## 2024-06-20 RX ADMIN — FLUTICASONE PROPIONATE 100 MCG: 50 SPRAY, METERED NASAL at 08:06

## 2024-06-20 RX ADMIN — VANCOMYCIN HYDROCHLORIDE 1500 MG: 1.5 INJECTION, POWDER, LYOPHILIZED, FOR SOLUTION INTRAVENOUS at 12:06

## 2024-06-20 RX ADMIN — POTASSIUM CHLORIDE 30 MEQ: 750 CAPSULE, EXTENDED RELEASE ORAL at 08:06

## 2024-06-20 RX ADMIN — APIXABAN 5 MG: 5 TABLET, FILM COATED ORAL at 08:06

## 2024-06-20 RX ADMIN — CEFEPIME 2 G: 2 INJECTION, POWDER, FOR SOLUTION INTRAVENOUS at 05:06

## 2024-06-20 RX ADMIN — HYDROCODONE BITARTRATE AND ACETAMINOPHEN 1 TABLET: 10; 325 TABLET ORAL at 12:06

## 2024-06-20 RX ADMIN — HYDROCODONE BITARTRATE AND ACETAMINOPHEN 1 TABLET: 10; 325 TABLET ORAL at 09:06

## 2024-06-20 RX ADMIN — CEFEPIME 2 G: 2 INJECTION, POWDER, FOR SOLUTION INTRAVENOUS at 11:06

## 2024-06-20 NOTE — SUBJECTIVE & OBJECTIVE
Interval History:   No events overnight. Surg onc evaluated patient tin AM and placed stitch to help secure drain      Review of Systems  Objective:     Vital Signs (Most Recent):  Temp: 98.3 °F (36.8 °C) (06/20/24 1612)  Pulse: 87 (06/20/24 1612)  Resp: 18 (06/20/24 1612)  BP: (!) 146/67 (06/20/24 1612)  SpO2: (!) 94 % (06/20/24 1612) Vital Signs (24h Range):  Temp:  [98.2 °F (36.8 °C)-99.5 °F (37.5 °C)] 98.3 °F (36.8 °C)  Pulse:  [] 87  Resp:  [16-19] 18  SpO2:  [94 %-100 %] 94 %  BP: (110-156)/(63-81) 146/67     Weight: 105.2 kg (232 lb)  Body mass index is 34.26 kg/m².    Intake/Output Summary (Last 24 hours) at 6/20/2024 1648  Last data filed at 6/20/2024 1151  Gross per 24 hour   Intake --   Output 1810 ml   Net -1810 ml         Physical Exam        Significant Labs: All pertinent labs within the past 24 hours have been reviewed.  CBC:   Recent Labs   Lab 06/19/24  1128 06/20/24  0425   WBC 13.88* 8.96   HGB 10.6* 9.5*   HCT 33.1* 30.1*    232     CMP:   Recent Labs   Lab 06/19/24  1128 06/20/24  0425    138   K 2.9* 3.1*    102   CO2 27 27   * 97   BUN 22 18   CREATININE 1.0 0.9   CALCIUM 9.1 8.9   PROT 6.0  --    ALBUMIN 3.0*  --    BILITOT 0.8  --    ALKPHOS 71  --    AST 13  --    ALT 10  --    ANIONGAP 9 9       Significant Imaging: I have reviewed all pertinent imaging results/findings within the past 24 hours.

## 2024-06-20 NOTE — HPI
68 year old male with HTN, HLD, BPH, PE on eliquis, CAD s/p PCI, right pelvis sarcoma s/p neoadjuvant chemotherapy followed by resection done at [facility] on May 28th admitted with sepsis. Has had a drain in the surgical wound since the operation which has had robust output. Recently had clinic follow-up at [facility] on 6/17; reported R calf pain and LL edema; X-ray of right pelvis and femur were unremarkable. Reports progressively worsening pain since his surgery; worst at posterior thigh. Mobilizing with walker but denies pain in hip joint. Reports fevers and chills with t max 101.9. No URTI symptoms. No abdominal pain, nausea, or vomiting. No dysuria or hematuria. Met SIRS criteria with fever and leukocytosis. Started on broad spectrum abx. Infectious diseases consulted for sepsis and abx management.

## 2024-06-20 NOTE — PROGRESS NOTES
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 He underwent radical resection at [facility] on 5/28/24. He iintially had been recovering well, however, he developed worsening pain at the surgical site over the past 2 days and spiked a fever to 101.7F last night so he decided to present to the ED. Of note, a drain was placed at time of surgery and per patient has had serous to serosanguinous output. Denies noting any purulent output. The incision is c/d/I, however, there is some surrounding induration and erythema. He did have a follow-up appointment at [facility] 2 days ago and was started on antibiotics at that time. Surgical Oncology was consulted for wound evaluation.     Post-Op Info:  * No surgery found *         Interval History:  Naeon. Afebrile. Erythema improved this am. Drain without purulent output. Place another drain stitch to hold in place better as patient states it's been moving and leaking around the drain.       Objective:     Vital Signs (Most Recent):  Temp: 98.5 °F (36.9 °C) (06/20/24 1016)  Pulse: 84 (06/20/24 1103)  Resp: 18 (06/20/24 1016)  BP: (!) 149/68 (06/20/24 1016)  SpO2: 96 % (06/20/24 1016) Vital Signs (24h Range):  Temp:  [98.2 °F (36.8 °C)-99.5 °F (37.5 °C)] 98.5 °F (36.9 °C)  Pulse:  [] 84  Resp:  [12-21] 18  SpO2:  [92 %-100 %] 96 %  BP: ()/(45-81) 149/68     Weight: 105.2 kg (232 lb)  Body mass index is 34.26 kg/m².     Physical Exam  Constitutional:       General: He is not in acute distress.     Appearance: Normal appearance.   HENT:      Head: Normocephalic and atraumatic.      Right Ear: Tympanic membrane normal.      Left Ear: Tympanic membrane normal.      Mouth/Throat:      Mouth: Mucous membranes are moist.   Eyes:      Extraocular Movements: Extraocular movements intact.      Conjunctiva/sclera:  Conjunctivae normal.   Cardiovascular:      Rate and Rhythm: Normal rate and regular rhythm.   Pulmonary:      Effort: Pulmonary effort is normal.   Abdominal:      General: Abdomen is flat. There is no distension.      Palpations: Abdomen is soft.      Tenderness: There is no abdominal tenderness.   Musculoskeletal:         General: Normal range of motion.      Cervical back: Normal range of motion.      Right lower leg: Edema present.   Skin:     General: Skin is warm and dry.      Comments: Incision extending from right posterior buttock to right lateral thigh c/d/I.   Drain to right lateral thigh   Neurological:      General: No focal deficit present.      Mental Status: He is alert and oriented to person, place, and time.   Psychiatric:         Mood and Affect: Mood normal.         Behavior: Behavior normal.            I have reviewed all pertinent lab results within the past 24 hours.    Significant Diagnostics:  I have reviewed all pertinent imaging results/findings within the past 24 hours.    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 He underwent radical resection at [facility] on 5/28/24 and presented to the ED today after increased surgical site pain x2d and fever. Minimal erythema and induration noted around incision.     - CT scan without fluid collection in surgical site  - No surgical intervention indicated a this time.   - Keep RLE elevated when in bed to help with edema  - Recommend IV antibiotics for another day and transitioning to PO  - Continue to monitor drain output  - Rest of care per primary team.   - Surgical Oncology will sign off. Please call with any questions.         Mandi Ojeda MD  General Surgery  [facility] - Med [facility] ([facility]-)

## 2024-06-20 NOTE — ASSESSMENT & PLAN NOTE
Patient's anemia is currently controlled. Has not received any PRBCs to date.   Hemoglobin appears consistent with prior value.  Suspect due to chronic inflammation from malignancy and possibly some postsurgical blood loss.    Current CBC reviewed-   Lab Results   Component Value Date    HGB 9.5 (L) 06/20/2024    HCT 30.1 (L) 06/20/2024     Monitor serial CBC and transfuse if patient becomes hemodynamically unstable, symptomatic or H/H drops below 7/21.

## 2024-06-20 NOTE — ASSESSMENT & PLAN NOTE
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 He underwent radical resection at [facility] on 5/28/24 and presented to the ED today after increased surgical site pain x2d and fever. Minimal erythema and induration noted around incision.     - CT scan without fluid collection in surgical site  - No surgical intervention indicated a this time.   - Keep RLE elevated when in bed to help with edema  - Recommend IV antibiotics for another day and transitioning to PO  - Continue to monitor drain output  - Rest of care per primary team.   - Surgical Oncology will sign off. Please call with any questions.

## 2024-06-20 NOTE — SUBJECTIVE & OBJECTIVE
Interval History:  Naeon. Afebrile. Erythema improved this am. Drain without purulent output. Place anothed drain stitch to hold in place better as patient states it's been moving and leaking around the drain.       Objective:     Vital Signs (Most Recent):  Temp: 98.5 °F (36.9 °C) (06/20/24 1016)  Pulse: 84 (06/20/24 1103)  Resp: 18 (06/20/24 1016)  BP: (!) 149/68 (06/20/24 1016)  SpO2: 96 % (06/20/24 1016) Vital Signs (24h Range):  Temp:  [98.2 °F (36.8 °C)-99.5 °F (37.5 °C)] 98.5 °F (36.9 °C)  Pulse:  [] 84  Resp:  [12-21] 18  SpO2:  [92 %-100 %] 96 %  BP: ()/(45-81) 149/68     Weight: 105.2 kg (232 lb)  Body mass index is 34.26 kg/m².     Physical Exam  Constitutional:       General: He is not in acute distress.     Appearance: Normal appearance.   HENT:      Head: Normocephalic and atraumatic.      Right Ear: Tympanic membrane normal.      Left Ear: Tympanic membrane normal.      Mouth/Throat:      Mouth: Mucous membranes are moist.   Eyes:      Extraocular Movements: Extraocular movements intact.      Conjunctiva/sclera: Conjunctivae normal.   Cardiovascular:      Rate and Rhythm: Normal rate and regular rhythm.   Pulmonary:      Effort: Pulmonary effort is normal.   Abdominal:      General: Abdomen is flat. There is no distension.      Palpations: Abdomen is soft.      Tenderness: There is no abdominal tenderness.   Musculoskeletal:         General: Normal range of motion.      Cervical back: Normal range of motion.      Right lower leg: Edema present.   Skin:     General: Skin is warm and dry.      Comments: Incision extending from right posterior buttock to right lateral thigh c/d/I.   Drain to right lateral thigh   Neurological:      General: No focal deficit present.      Mental Status: He is alert and oriented to person, place, and time.   Psychiatric:         Mood and Affect: Mood normal.         Behavior: Behavior normal.            I have reviewed all pertinent lab results within the past  24 hours.    Significant Diagnostics:  I have reviewed all pertinent imaging results/findings within the past 24 hours.

## 2024-06-20 NOTE — ASSESSMENT & PLAN NOTE
- Patient reports history of stenting  - Continue high-intensity statin  - Continue aspirin and Plavix

## 2024-06-20 NOTE — CONSULTS
[facility] ([place] [address])  Infectious Disease  Consult Note    Patient Name: Steven Sommers  MRN: [id number]  Admission Date: 6/19/2024  Hospital Length of Stay: 0 days  Attending Physician: Stevan Nugent MD  Primary Care Provider: Hai Fitzgerald MD     Isolation Status: No active isolations    Patient information was obtained from patient, past medical records, and ER records.      Inpatient consult to Infectious Diseases  Consult performed by: Malissa Bain MD  Consult ordered by: Stevan Nugent MD        Assessment/Plan:     ID  * Sepsis  Presenting with fever and right thigh pain 3 weeks post-op of a sarcoma removal. Has had a drain in situ since the operation which has continued to have serous/cloudy output, no purulent output  Thigh pain has been progressively worsening since the operation. No wound dehiscence or drainage   Febrile at home to 101.9, low grade fever on admission; met SIRS criteria with fever and leukocytosis  Leukocytosis now improving, lactate normal;   UA unremarkable. BCx NTD; CXR with interstitial opacities BL but no obvious consolidation  CT right hip showing subcutaneous edema and stranding, no evidence of collection  Exam notable for mild erythema at proximal aspect of thigh with associated warmth and induration but no fluctuance   Presentation and exam favoring cellulitis that is already improving    Recommend:  - continue vancomycin and cefepime while inpatient; switch to PO abx on discharge, total duration TBD  - MRSA nares PCR (ordered)  - f/u BCx    Cellulitis of leg  See sepsis    Oncology  Leukocytosis  See sepsis      Thank you for your consult. I will follow-up with patient. Please contact us if you have any additional questions.    Malissa Bain MD  Infectious Disease  [facility] ([place] [address])    Subjective:     Principal Problem: Sepsis    HPI: 68 year old male with HTN, HLD, BPH, PE on eliquis, CAD s/p PCI, right pelvis  sarcoma s/p neoadjuvant chemotherapy followed by resection done at [facility] on May 28th admitted with sepsis. Has had a drain in the surgical wound since the operation which has had robust output. Recently had clinic follow-up at [facility] on 6/17; reported R calf pain and LL edema; X-ray of right pelvis and femur were unremarkable. Reports progressively worsening pain since his surgery; worst at posterior thigh. Mobilizing with walker but denies pain in hip joint. Reports fevers and chills with t max 101.9. No URTI symptoms. No abdominal pain, nausea, or vomiting. No dysuria or hematuria. Met SIRS criteria with fever and leukocytosis. Started on broad spectrum abx. Infectious diseases consulted for sepsis and abx management.     Past Medical History:   Diagnosis Date    Hypertension      No past surgical history on file.    Review of patient's allergies indicates:  No Known Allergies    Medications:  Medications Prior to Admission   Medication Sig    apixaban (ELIQUIS) 2.5 mg Tab Take 5 mg by mouth 2 (two) times daily.    atorvastatin (LIPITOR) 80 MG tablet Take 1 tablet (80 mg total) by mouth once daily.    cefadroxil (DURICEF) 500 MG Cap Take 500 mg by mouth every 12 (twelve) hours.    gabapentin (NEURONTIN) 100 MG capsule Take 100 mg by mouth 2 (two) times daily.    HYDROcodone-acetaminophen (NORCO) 5-325 mg per tablet Take 1 tablet by mouth every 4 (four) hours as needed for Pain.    lisinopril-hydrochlorothiazide (PRINZIDE,ZESTORETIC) 10-12.5 mg per tablet Take 1 tablet by mouth once daily.    methocarbamoL (ROBAXIN) 500 MG Tab Take 500 mg by mouth every 8 (eight) hours as needed (as needed for muscle spasms.).    metoprolol succinate (TOPROL-XL) 100 MG 24 hr tablet Take 1 tablet (100 mg total) by mouth once daily.    aspirin (ECOTRIN) 81 MG EC tablet Take 1 tablet (81 mg total) by mouth once daily. (Patient not taking: Reported on 6/19/2024)     Antibiotics (From admission, onward)      Start     Stop  "Route Frequency Ordered    06/20/24 0100  vancomycin 1,500 mg in dextrose 5 % (D5W) 250 mL IVPB (Vial-Mate)         -- IV Every 12 hours (non-standard times) 06/19/24 1613    06/19/24 2000  ceFEPIme (MAXIPIME) 2 g in dextrose 5 % in water (D5W) 100 mL IVPB (MB+)         -- IV Every 8 hours (non-standard times) 06/19/24 1544    06/19/24 1643  vancomycin - pharmacy to dose  (vancomycin IVPB (PEDS and ADULTS))        Placed in "And" Linked Group    -- IV pharmacy to manage frequency 06/19/24 1544          Antifungals (From admission, onward)      None          Antivirals (From admission, onward)      None             There is no immunization history for the selected administration types on file for this patient.    Family History    None       Social History     Socioeconomic History    Marital status: Single   Tobacco Use    Smoking status: Never    Smokeless tobacco: Never   Substance and Sexual Activity    Alcohol use: Yes     Comment: Occasionally     Drug use: No     Social Determinants of Health     Financial Resource Strain: Low Risk  (6/20/2024)    Overall Financial Resource Strain (CARDIA)     Difficulty of Paying Living Expenses: Not hard at all   Food Insecurity: No Food Insecurity (10/18/2022)    Received from [facility]    Hunger Vital Sign     Worried About Running Out of Food in the Last Year: Never true     Ran Out of Food in the Last Year: Never true   Transportation Needs: No Transportation Needs (10/18/2022)    Received from [facility]    PRAPARE - Transportation     Lack of Transportation (Medical): No     Lack of Transportation (Non-Medical): No   Physical Activity: Inactive (6/20/2024)    Exercise Vital Sign     Days of Exercise per Week: 0 days     Minutes of Exercise per Session: 0 min   Stress: No Stress Concern Present (6/20/2024)    [place] citizen [place] [employer] - Occupational Stress Questionnaire     Feeling of Stress : Not at all     Review of Systems   Constitutional:  " Positive for fever. Negative for chills.   HENT:  Negative for congestion and sore throat.    Respiratory:  Negative for cough, shortness of breath and wheezing.    Cardiovascular:  Positive for leg swelling. Negative for chest pain.   Gastrointestinal:  Negative for abdominal pain, diarrhea, nausea and vomiting.   Genitourinary:  Negative for dysuria and hematuria.   Musculoskeletal:  Negative for joint swelling.   Skin:  Positive for color change and wound.   Neurological:  Negative for dizziness and headaches.   Psychiatric/Behavioral:  Negative for agitation and confusion.      Objective:     Vital Signs (Most Recent):  Temp: 98.5 °F (36.9 °C) (06/20/24 1016)  Pulse: 84 (06/20/24 1103)  Resp: 18 (06/20/24 1016)  BP: (!) 149/68 (06/20/24 1016)  SpO2: 96 % (06/20/24 1016) Vital Signs (24h Range):  Temp:  [98.2 °F (36.8 °C)-99.5 °F (37.5 °C)] 98.5 °F (36.9 °C)  Pulse:  [] 84  Resp:  [15-21] 18  SpO2:  [94 %-100 %] 96 %  BP: ()/(45-81) 149/68     Weight: 105.2 kg (232 lb)  Body mass index is 34.26 kg/m².    Estimated Creatinine Clearance: 91.3 mL/min (based on SCr of 0.9 mg/dL).     Physical Exam  Vitals and nursing note reviewed.   Constitutional:       General: He is not in acute distress.     Appearance: Normal appearance. He is not ill-appearing.   HENT:      Head: Normocephalic and atraumatic.      Mouth/Throat:      Mouth: Mucous membranes are moist.   Eyes:      Extraocular Movements: Extraocular movements intact.      Conjunctiva/sclera: Conjunctivae normal.   Cardiovascular:      Rate and Rhythm: Normal rate and regular rhythm.      Pulses: Normal pulses.      Heart sounds: Normal heart sounds. No murmur heard.  Pulmonary:      Effort: Pulmonary effort is normal.      Breath sounds: Normal breath sounds. No wheezing, rhonchi or rales.   Abdominal:      General: Bowel sounds are normal.      Palpations: Abdomen is soft.      Tenderness: There is no abdominal tenderness. There is no guarding.    Musculoskeletal:         General: Swelling and tenderness present.      Right lower leg: Edema (non-pitting at ankles) present.      Left lower leg: Edema (non-pitting at ankles) present.        Legs:       Comments: Well healed incision; no wound dehiscence or drainage  Mild erythema at proximal aspect of incision; associated warmth  No erythema or warmth around the drain at the distal aspect of the thigh   Skin:     General: Skin is warm.      Capillary Refill: Capillary refill takes less than 2 seconds.      Findings: Erythema (mild erythema around proximal aspect of right hip at top of incision) present. No rash.   Neurological:      General: No focal deficit present.      Mental Status: He is alert and oriented to person, place, and time.   Psychiatric:         Mood and Affect: Mood normal.         Thought Content: Thought content normal.          Significant Labs: Blood Culture:   Recent Labs   Lab 06/19/24  1130   LABBLOO No Growth to date  No Growth to date  No Growth to date  No Growth to date     CBC:   Recent Labs   Lab 06/19/24  1128 06/20/24  0425   WBC 13.88* 8.96   HGB 10.6* 9.5*   HCT 33.1* 30.1*    232     CMP:   Recent Labs   Lab 06/19/24  1128 06/20/24  0425    138   K 2.9* 3.1*    102   CO2 27 27   * 97   BUN 22 18   CREATININE 1.0 0.9   CALCIUM 9.1 8.9   PROT 6.0  --    ALBUMIN 3.0*  --    BILITOT 0.8  --    ALKPHOS 71  --    AST 13  --    ALT 10  --    ANIONGAP 9 9     Lactic Acid:   Recent Labs   Lab 06/19/24  1539   LACTATE 1.4     Urine Studies:   Recent Labs   Lab 06/19/24  1608   COLORU Yellow   APPEARANCEUA Clear   PHUR 6.0   SPECGRAV >1.030*   PROTEINUA Trace*   GLUCUA Negative   KETONESU Negative   BILIRUBINUA Negative   OCCULTUA Negative   NITRITE Negative   LEUKOCYTESUR Negative     All pertinent labs within the past 24 hours have been reviewed.  Recent Lab Results         06/20/24  0425   06/19/24  1608   06/19/24  1539        Anion Gap 9            Appearance, UA   Clear         Baso # 0.03           Basophil % 0.3           Bilirubin (UA)   Negative         BNP     237  Comment: Values of less than 100 pg/ml are consistent with non-CHF populations.       BUN 18           Calcium 8.9           Chloride 102           CO2 27           Color, UA   Yellow         Creatinine 0.9           Differential Method Automated           eGFR >60.0           Eos # 0.1           Eos % 1.5           Glucose 97           Glucose, UA   Negative         Gran # (ANC) 7.1           Gran % 79.6           Hematocrit 30.1           Hemoglobin 9.5           Immature Grans (Abs) 0.02  Comment: Mild elevation in immature granulocytes is non specific and   can be seen in a variety of conditions including stress response,   acute inflammation, trauma and pregnancy. Correlation with other   laboratory and clinical findings is essential.             Immature Granulocytes 0.2           Ketones, UA   Negative         Lactic Acid Level     1.4  Comment: Falsely low lactic acid results can be found in samples   containing >=13.0 mg/dL total bilirubin and/or >=3.5 mg/dL   direct bilirubin.         Leukocyte Esterase, UA   Negative         Lymph # 0.9           Lymph % 9.6           Magnesium  2.1           MCH 29.9           MCHC 31.6           MCV 95           Mono # 0.8           Mono % 8.8           MPV 10.0           NITRITE UA   Negative         nRBC 0           Blood, UA   Negative         pH, UA   6.0         Phosphorus Level 3.1           Platelet Count 232           Potassium 3.1           Protein, UA   Trace  Comment: Recommend a 24 hour urine protein or a urine   protein/creatinine ratio if globulin induced proteinuria is  clinically suspected.           RBC 3.18           RDW 15.7           Sodium 138           Spec Grav UA   >1.030         Specimen UA   Urine, Clean Catch         WBC 8.96                 Significant Imaging: I have reviewed all pertinent imaging results/findings  within the past 24 hours.

## 2024-06-20 NOTE — ASSESSMENT & PLAN NOTE
Chronic, controlled. Latest blood pressure and vitals reviewed-     Temp:  [98.2 °F (36.8 °C)-99 °F (37.2 °C)]   Pulse:  []   Resp:  [16-18]   BP: (123-156)/(67-80)   SpO2:  [94 %-100 %] .   Home meds for hypertension were reviewed and noted below.   Hypertension Medications               lisinopril-hydrochlorothiazide (PRINZIDE,ZESTORETIC) 10-12.5 mg per tablet Take 1 tablet by mouth once daily.    metoprolol succinate (TOPROL-XL) 100 MG 24 hr tablet Take 1 tablet (100 mg total) by mouth once daily.     - While in the hospital, will manage blood pressure as follows; hold patient's home antihypertensives for sepsis  - Titrate antihypertensives  - PRN Hydralazine

## 2024-06-20 NOTE — PLAN OF CARE
Problem: Adult Inpatient Plan of Care  Goal: Plan of Care Review  Outcome: Progressing  Goal: Patient-Specific Goal (Individualized)  Outcome: Progressing  Goal: Absence of Hospital-Acquired Illness or Injury  Outcome: Progressing  Goal: Optimal Comfort and Wellbeing  Outcome: Progressing  Goal: Readiness for Transition of Care  Outcome: Progressing     Problem: Sepsis/Septic Shock  Goal: Optimal Coping  Outcome: Progressing  Goal: Absence of Bleeding  Outcome: Progressing  Goal: Blood Glucose Level Within Targeted Range  Outcome: Progressing  Goal: Absence of Infection Signs and Symptoms  Outcome: Progressing  Goal: Optimal Nutrition Intake  Outcome: Progressing

## 2024-06-20 NOTE — ASSESSMENT & PLAN NOTE
1/8/24. Incidental note of Right lower lobe segmental pulmonary embolism.   - Continue home Eliquis

## 2024-06-20 NOTE — PLAN OF CARE
"Pt AAOX4, continued on iv abx, pt surgical site is leaking and changed the dressing as needed per order.  Pt stated that " I feel like the tube needs to push back in more"  MD was at bedside and also changed the dressing. Total of 140 ml output discarded from the drain. Prn pain med given for pain and discomfort during this shift, VS stable, scheduled meds given urinal place within reach. Educated pt on the use of the call light Instructed the pt to call for assitance if needed, call light within reach, family remains at bedside.  No acute events noted during this shift. Plan of care ongoing.   Problem: Adult Inpatient Plan of Care  Goal: Plan of Care Review  Outcome: Progressing  Flowsheets (Taken 6/20/2024 8107)  Plan of Care Reviewed With: patient  Goal: Patient-Specific Goal (Individualized)  Outcome: Progressing  Goal: Absence of Hospital-Acquired Illness or Injury  Outcome: Progressing  Goal: Optimal Comfort and Wellbeing  Outcome: Progressing  Goal: Readiness for Transition of Care  Outcome: Progressing     Problem: Sepsis/Septic Shock  Goal: Optimal Coping  Outcome: Progressing  Goal: Absence of Bleeding  Outcome: Progressing  Goal: Blood Glucose Level Within Targeted Range  Outcome: Progressing  Goal: Absence of Infection Signs and Symptoms  Outcome: Progressing  Goal: Optimal Nutrition Intake  Outcome: Progressing     Problem: Fall Injury Risk  Goal: Absence of Fall and Fall-Related Injury  Outcome: Progressing     "

## 2024-06-20 NOTE — SUBJECTIVE & OBJECTIVE
"Past Medical History:   Diagnosis Date    Hypertension      No past surgical history on file.    Review of patient's allergies indicates:  No Known Allergies    Medications:  Medications Prior to Admission   Medication Sig    apixaban (ELIQUIS) 2.5 mg Tab Take 5 mg by mouth 2 (two) times daily.    atorvastatin (LIPITOR) 80 MG tablet Take 1 tablet (80 mg total) by mouth once daily.    cefadroxil (DURICEF) 500 MG Cap Take 500 mg by mouth every 12 (twelve) hours.    gabapentin (NEURONTIN) 100 MG capsule Take 100 mg by mouth 2 (two) times daily.    HYDROcodone-acetaminophen (NORCO) 5-325 mg per tablet Take 1 tablet by mouth every 4 (four) hours as needed for Pain.    lisinopril-hydrochlorothiazide (PRINZIDE,ZESTORETIC) 10-12.5 mg per tablet Take 1 tablet by mouth once daily.    methocarbamoL (ROBAXIN) 500 MG Tab Take 500 mg by mouth every 8 (eight) hours as needed (as needed for muscle spasms.).    metoprolol succinate (TOPROL-XL) 100 MG 24 hr tablet Take 1 tablet (100 mg total) by mouth once daily.    aspirin (ECOTRIN) 81 MG EC tablet Take 1 tablet (81 mg total) by mouth once daily. (Patient not taking: Reported on 6/19/2024)     Antibiotics (From admission, onward)      Start     Stop Route Frequency Ordered    06/20/24 0100  vancomycin 1,500 mg in dextrose 5 % (D5W) 250 mL IVPB (Vial-Mate)         -- IV Every 12 hours (non-standard times) 06/19/24 1613    06/19/24 2000  ceFEPIme (MAXIPIME) 2 g in dextrose 5 % in water (D5W) 100 mL IVPB (MB+)         -- IV Every 8 hours (non-standard times) 06/19/24 1544    06/19/24 1643  vancomycin - pharmacy to dose  (vancomycin IVPB (PEDS and ADULTS))        Placed in "And" Linked Group    -- IV pharmacy to manage frequency 06/19/24 1544          Antifungals (From admission, onward)      None          Antivirals (From admission, onward)      None             There is no immunization history for the selected administration types on file for this patient.    Family History    None   "     Social History     Socioeconomic History    Marital status: Single   Tobacco Use    Smoking status: Never    Smokeless tobacco: Never   Substance and Sexual Activity    Alcohol use: Yes     Comment: Occasionally     Drug use: No     Social Determinants of Health     Financial Resource Strain: Low Risk  (6/20/2024)    Overall Financial Resource Strain (CARDIA)     Difficulty of Paying Living Expenses: Not hard at all   Food Insecurity: No Food Insecurity (10/18/2022)    Received from [facility]    Hunger Vital Sign     Worried About Running Out of Food in the Last Year: Never true     Ran Out of Food in the Last Year: Never true   Transportation Needs: No Transportation Needs (10/18/2022)    Received from [facility]    PRAPARE - Transportation     Lack of Transportation (Medical): No     Lack of Transportation (Non-Medical): No   Physical Activity: Inactive (6/20/2024)    Exercise Vital Sign     Days of Exercise per Week: 0 days     Minutes of Exercise per Session: 0 min   Stress: No Stress Concern Present (6/20/2024)    [employer] - Occupational Stress Questionnaire     Feeling of Stress : Not at all     Review of Systems   Constitutional:  Positive for fever. Negative for chills.   HENT:  Negative for congestion and sore throat.    Respiratory:  Negative for cough, shortness of breath and wheezing.    Cardiovascular:  Positive for leg swelling. Negative for chest pain.   Gastrointestinal:  Negative for abdominal pain, diarrhea, nausea and vomiting.   Genitourinary:  Negative for dysuria and hematuria.   Musculoskeletal:  Negative for joint swelling.   Skin:  Positive for color change and wound.   Neurological:  Negative for dizziness and headaches.   Psychiatric/Behavioral:  Negative for agitation and confusion.      Objective:     Vital Signs (Most Recent):  Temp: 98.5 °F (36.9 °C) (06/20/24 1016)  Pulse: 84 (06/20/24 1103)  Resp: 18 (06/20/24 1016)  BP: (!) 149/68 (06/20/24  1016)  SpO2: 96 % (06/20/24 1016) Vital Signs (24h Range):  Temp:  [98.2 °F (36.8 °C)-99.5 °F (37.5 °C)] 98.5 °F (36.9 °C)  Pulse:  [] 84  Resp:  [15-21] 18  SpO2:  [94 %-100 %] 96 %  BP: ()/(45-81) 149/68     Weight: 105.2 kg (232 lb)  Body mass index is 34.26 kg/m².    Estimated Creatinine Clearance: 91.3 mL/min (based on SCr of 0.9 mg/dL).     Physical Exam  Vitals and nursing note reviewed.   Constitutional:       General: He is not in acute distress.     Appearance: Normal appearance. He is not ill-appearing.   HENT:      Head: Normocephalic and atraumatic.      Mouth/Throat:      Mouth: Mucous membranes are moist.   Eyes:      Extraocular Movements: Extraocular movements intact.      Conjunctiva/sclera: Conjunctivae normal.   Cardiovascular:      Rate and Rhythm: Normal rate and regular rhythm.      Pulses: Normal pulses.      Heart sounds: Normal heart sounds. No murmur heard.  Pulmonary:      Effort: Pulmonary effort is normal.      Breath sounds: Normal breath sounds. No wheezing, rhonchi or rales.   Abdominal:      General: Bowel sounds are normal.      Palpations: Abdomen is soft.      Tenderness: There is no abdominal tenderness. There is no guarding.   Musculoskeletal:         General: Swelling and tenderness present.      Right lower leg: Edema (non-pitting at ankles) present.      Left lower leg: Edema (non-pitting at ankles) present.        Legs:       Comments: Well healed incision; no wound dehiscence or drainage  Mild erythema at proximal aspect of incision; associated warmth  No erythema or warmth around the drain at the distal aspect of the thigh   Skin:     General: Skin is warm.      Capillary Refill: Capillary refill takes less than 2 seconds.      Findings: Erythema (mild erythema around proximal aspect of right hip at top of incision) present. No rash.   Neurological:      General: No focal deficit present.      Mental Status: He is alert and oriented to person, place, and time.    Psychiatric:         Mood and Affect: Mood normal.         Thought Content: Thought content normal.          Significant Labs: Blood Culture:   Recent Labs   Lab 06/19/24  1130   LABBLOO No Growth to date  No Growth to date  No Growth to date  No Growth to date     CBC:   Recent Labs   Lab 06/19/24  1128 06/20/24  0425   WBC 13.88* 8.96   HGB 10.6* 9.5*   HCT 33.1* 30.1*    232     CMP:   Recent Labs   Lab 06/19/24  1128 06/20/24  0425    138   K 2.9* 3.1*    102   CO2 27 27   * 97   BUN 22 18   CREATININE 1.0 0.9   CALCIUM 9.1 8.9   PROT 6.0  --    ALBUMIN 3.0*  --    BILITOT 0.8  --    ALKPHOS 71  --    AST 13  --    ALT 10  --    ANIONGAP 9 9     Lactic Acid:   Recent Labs   Lab 06/19/24  1539   LACTATE 1.4     Urine Studies:   Recent Labs   Lab 06/19/24  1608   COLORU Yellow   APPEARANCEUA Clear   PHUR 6.0   SPECGRAV >1.030*   PROTEINUA Trace*   GLUCUA Negative   KETONESU Negative   BILIRUBINUA Negative   OCCULTUA Negative   NITRITE Negative   LEUKOCYTESUR Negative     All pertinent labs within the past 24 hours have been reviewed.  Recent Lab Results         06/20/24  0425   06/19/24  1608   06/19/24  1539        Anion Gap 9           Appearance, UA   Clear         Baso # 0.03           Basophil % 0.3           Bilirubin (UA)   Negative         BNP     237  Comment: Values of less than 100 pg/ml are consistent with non-CHF populations.       BUN 18           Calcium 8.9           Chloride 102           CO2 27           Color, UA   Yellow         Creatinine 0.9           Differential Method Automated           eGFR >60.0           Eos # 0.1           Eos % 1.5           Glucose 97           Glucose, UA   Negative         Gran # (ANC) 7.1           Gran % 79.6           Hematocrit 30.1           Hemoglobin 9.5           Immature Grans (Abs) 0.02  Comment: Mild elevation in immature granulocytes is non specific and   can be seen in a variety of conditions including stress response,    acute inflammation, trauma and pregnancy. Correlation with other   laboratory and clinical findings is essential.             Immature Granulocytes 0.2           Ketones, UA   Negative         Lactic Acid Level     1.4  Comment: Falsely low lactic acid results can be found in samples   containing >=13.0 mg/dL total bilirubin and/or >=3.5 mg/dL   direct bilirubin.         Leukocyte Esterase, UA   Negative         Lymph # 0.9           Lymph % 9.6           Magnesium  2.1           MCH 29.9           MCHC 31.6           MCV 95           Mono # 0.8           Mono % 8.8           MPV 10.0           NITRITE UA   Negative         nRBC 0           Blood, UA   Negative         pH, UA   6.0         Phosphorus Level 3.1           Platelet Count 232           Potassium 3.1           Protein, UA   Trace  Comment: Recommend a 24 hour urine protein or a urine   protein/creatinine ratio if globulin induced proteinuria is  clinically suspected.           RBC 3.18           RDW 15.7           Sodium 138           Spec Grav UA   >1.030         Specimen UA   Urine, Clean Catch         WBC 8.96                 Significant Imaging: I have reviewed all pertinent imaging results/findings within the past 24 hours.

## 2024-06-20 NOTE — PLAN OF CARE
[facility] - Med S[facility] ([facility]  Initial Discharge Assessment       Primary Care Provider: Hai Fitzgerald MD    Admission Diagnosis: Fever [R50.9]  Chest pain [R07.9]    Admission Date: 6/19/2024  Expected Discharge Date: 6/22/2024    Transition of Care Barriers: (P) None    Payor: MEDICARE / Plan: MEDICARE PART A & B / Product Type: Government /     Extended Emergency Contact Information  Primary Emergency Contact: SommersLori   [facility]  Home Phone: [phone]  Relation: Son    Discharge Plan A: (P) Home  Discharge Plan B: (P) Home with family      [id number]  [place], [place] - [address] AT [facility] [address]  [address]  [place] [address]  Phone: [phone] Fax: [phone]      Initial Assessment (most recent)       Adult Discharge Assessment - 06/20/24 1040          Discharge Assessment    Assessment Type Discharge Planning Assessment (P)      Confirmed/corrected address, phone number and insurance Yes (P)      Confirmed Demographics Correct on Facesheet (P)      Source of Information patient (P)      Communicated HAM with patient/caregiver Yes (P)      Reason For Admission Sepsis (P)      People in Home child(edwina), adult (P)      Facility Arrived From: n/a (P)      Do you expect to return to your current living situation? Yes (P)      Do you have help at home or someone to help you manage your care at home? Yes (P)      Who are your caregiver(s) and their phone number(s)? Jimmie Sommers [phone] (P)      Prior to hospitilization cognitive status: Unable to Assess (P)      Current cognitive status: Alert/Oriented (P)      Walking or Climbing Stairs Difficulty yes (P)      Walking or Climbing Stairs ambulation difficulty, requires equipment;stair climbing difficulty, requires equipment (P)      Mobility Management Uses crutches, RW for ambulation, does fine with those (P)      Dressing/Bathing Difficulty no (P)      Home  Layout Bedroom on 2nd floor (P)      Equipment Currently Used at Home crutches, axillary;walker, rolling (P)      Readmission within 30 days? No (P)      Do you currently have service(s) that help you manage your care at home? No (P)      Do you take prescription medications? Yes (P)      Do you have prescription coverage? Yes (P)      Coverage [employer] (P)      Who is going to help you get home at discharge? Girlfriend (P)      How do you get to doctors appointments? car, drives self;family or friend will provide (P)      Are you on dialysis? No (P)      Do you take coumadin? No (P)      Discharge Plan A Home (P)      Discharge Plan B Home with family (P)      DME Needed Upon Discharge  none (P)      Discharge Plan discussed with: Patient (P)      Transition of Care Barriers None (P)         Physical Activity    On average, how many days per week do you engage in moderate to strenuous exercise (like a brisk walk)? 0 days (P)      On average, how many minutes do you engage in exercise at this level? 0 min (P)         Financial Resource Strain    How hard is it for you to pay for the very basics like food, housing, medical care, and heating? Not hard at all (P)         Stress    Do you feel stress - tense, restless, nervous, or anxious, or unable to sleep at night because your mind is troubled all the time - these days? Not at all (P)         Social Isolation    How often do you feel lonely or isolated from those around you?  Never (P)         Alcohol Use    Q1: How often do you have a drink containing alcohol? Never (P)      Q2: How many drinks containing alcohol do you have on a typical day when you are drinking? Patient does not drink (P)      Q3: How often do you have six or more drinks on one occasion? Never (P)         Health Literacy    How often do you need to have someone help you when you read instructions, pamphlets, or other written material from your doctor or pharmacy? Never (P)         OTHER    Name(s)  of People in Home Son Steven Sommers [phone] (P)                  CM spoke with pt in room.  Pt lives in 2 story house with 13 stairs, bedroom is upstairs, navigates stairs with crutches and handrails, ambulates with crutches or RW.  Pt came from home, lives with son.  His girlfriend will drive him home, and he drives or gets driven to MD appointments.  Pt gets CA tx at [facility].  No HH, coumadin, or HD.  Independent with bathing, dressing.  Pt v/u obs status.  Pharmacy [facility] [address] [facility].    KAYLA Cowan, BS, RN, CCM      Discharge Plan A and Plan B have been determined by review of patient's clinical status, future medical and therapeutic needs, and coverage/benefits for post-acute care in coordination with multidisciplinary team members.

## 2024-06-20 NOTE — ASSESSMENT & PLAN NOTE
Presenting with fever and right thigh pain 3 weeks post-op of a sarcoma removal. Has had a drain in situ since the operation which has continued to have serous/cloudy output, no purulent output  Thigh pain has been progressively worsening since the operation. No wound dehiscence or drainage   Febrile at home to 101.9, low grade fever on admission; met SIRS criteria with fever and leukocytosis  Leukocytosis now improving, lactate normal;   UA unremarkable. BCx NTD; CXR with interstitial opacities BL but no obvious consolidation  CT right hip showing subcutaneous edema and stranding, no evidence of collection  Exam notable for mild erythema at proximal aspect of thigh with associated warmth and induration but no fluctuance   Presentation and exam favoring cellulitis that is already improving    Recommend:  - continue vancomycin and cefepime while inpatient; switch to PO abx on discharge, total duration TBD  - MRSA nares PCR (ordered)  - f/u BCx

## 2024-06-20 NOTE — ASSESSMENT & PLAN NOTE
-Patient has hypokalemia which is Acute on Chronic and currently uncontrolled. Most recent potassium levels reviewed-   Lab Results   Component Value Date    K 3.1 (L) 06/20/2024     Will continue potassium replacement per protocol and recheck repeat levels after replacement completed.

## 2024-06-20 NOTE — ASSESSMENT & PLAN NOTE
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

## 2024-06-21 VITALS
SYSTOLIC BLOOD PRESSURE: 166 MMHG | BODY MASS INDEX: 34.36 KG/M2 | DIASTOLIC BLOOD PRESSURE: 74 MMHG | TEMPERATURE: 99 F | HEIGHT: 69 IN | RESPIRATION RATE: 16 BRPM | WEIGHT: 232 LBS | OXYGEN SATURATION: 98 % | HEART RATE: 84 BPM

## 2024-06-21 LAB
ALBUMIN SERPL BCP-MCNC: 2.3 G/DL (ref 3.5–5.2)
ANION GAP SERPL CALC-SCNC: 7 MMOL/L (ref 8–16)
BASOPHILS # BLD AUTO: 0.02 K/UL (ref 0–0.2)
BASOPHILS NFR BLD: 0.3 % (ref 0–1.9)
BUN SERPL-MCNC: 18 MG/DL (ref 8–23)
CALCIUM SERPL-MCNC: 8.6 MG/DL (ref 8.7–10.5)
CHLORIDE SERPL-SCNC: 104 MMOL/L (ref 95–110)
CO2 SERPL-SCNC: 24 MMOL/L (ref 23–29)
CREAT SERPL-MCNC: 0.8 MG/DL (ref 0.5–1.4)
DIFFERENTIAL METHOD BLD: ABNORMAL
EOSINOPHIL # BLD AUTO: 0.4 K/UL (ref 0–0.5)
EOSINOPHIL NFR BLD: 4.5 % (ref 0–8)
ERYTHROCYTE [DISTWIDTH] IN BLOOD BY AUTOMATED COUNT: 15.4 % (ref 11.5–14.5)
EST. GFR  (NO RACE VARIABLE): >60 ML/MIN/1.73 M^2
GLUCOSE SERPL-MCNC: 120 MG/DL (ref 70–110)
HCT VFR BLD AUTO: 29.1 % (ref 40–54)
HGB BLD-MCNC: 9.1 G/DL (ref 14–18)
IMM GRANULOCYTES # BLD AUTO: 0.03 K/UL (ref 0–0.04)
IMM GRANULOCYTES NFR BLD AUTO: 0.4 % (ref 0–0.5)
LYMPHOCYTES # BLD AUTO: 0.7 K/UL (ref 1–4.8)
LYMPHOCYTES NFR BLD: 8.7 % (ref 18–48)
MAGNESIUM SERPL-MCNC: 2.1 MG/DL (ref 1.6–2.6)
MCH RBC QN AUTO: 29.4 PG (ref 27–31)
MCHC RBC AUTO-ENTMCNC: 31.3 G/DL (ref 32–36)
MCV RBC AUTO: 94 FL (ref 82–98)
MONOCYTES # BLD AUTO: 0.7 K/UL (ref 0.3–1)
MONOCYTES NFR BLD: 9.3 % (ref 4–15)
NEUTROPHILS # BLD AUTO: 5.9 K/UL (ref 1.8–7.7)
NEUTROPHILS NFR BLD: 76.8 % (ref 38–73)
NRBC BLD-RTO: 0 /100 WBC
PHOSPHATE SERPL-MCNC: 3.4 MG/DL (ref 2.7–4.5)
PLATELET # BLD AUTO: 243 K/UL (ref 150–450)
PMV BLD AUTO: 10.1 FL (ref 9.2–12.9)
POTASSIUM SERPL-SCNC: 3.7 MMOL/L (ref 3.5–5.1)
RBC # BLD AUTO: 3.1 M/UL (ref 4.6–6.2)
SODIUM SERPL-SCNC: 135 MMOL/L (ref 136–145)
VANCOMYCIN TROUGH SERPL-MCNC: 17 UG/ML (ref 10–22)
WBC # BLD AUTO: 7.73 K/UL (ref 3.9–12.7)

## 2024-06-21 PROCEDURE — 25000003 PHARM REV CODE 250: Performed by: STUDENT IN AN ORGANIZED HEALTH CARE EDUCATION/TRAINING PROGRAM

## 2024-06-21 PROCEDURE — 85025 COMPLETE CBC W/AUTO DIFF WBC: CPT | Performed by: STUDENT IN AN ORGANIZED HEALTH CARE EDUCATION/TRAINING PROGRAM

## 2024-06-21 PROCEDURE — 83735 ASSAY OF MAGNESIUM: CPT | Performed by: STUDENT IN AN ORGANIZED HEALTH CARE EDUCATION/TRAINING PROGRAM

## 2024-06-21 PROCEDURE — 63600175 PHARM REV CODE 636 W HCPCS: Performed by: STUDENT IN AN ORGANIZED HEALTH CARE EDUCATION/TRAINING PROGRAM

## 2024-06-21 PROCEDURE — 36415 COLL VENOUS BLD VENIPUNCTURE: CPT | Performed by: STUDENT IN AN ORGANIZED HEALTH CARE EDUCATION/TRAINING PROGRAM

## 2024-06-21 PROCEDURE — 80069 RENAL FUNCTION PANEL: CPT | Performed by: STUDENT IN AN ORGANIZED HEALTH CARE EDUCATION/TRAINING PROGRAM

## 2024-06-21 PROCEDURE — 80202 ASSAY OF VANCOMYCIN: CPT | Performed by: STUDENT IN AN ORGANIZED HEALTH CARE EDUCATION/TRAINING PROGRAM

## 2024-06-21 PROCEDURE — 99232 SBSQ HOSP IP/OBS MODERATE 35: CPT | Mod: GC,,, | Performed by: INTERNAL MEDICINE

## 2024-06-21 RX ORDER — CLOPIDOGREL BISULFATE 75 MG/1
75 TABLET ORAL DAILY
Start: 2024-06-22 | End: 2025-06-22

## 2024-06-21 RX ORDER — HYDROCODONE BITARTRATE AND ACETAMINOPHEN 10; 325 MG/1; MG/1
1 TABLET ORAL EVERY 6 HOURS PRN
Qty: 21 TABLET | Refills: 0 | Status: SHIPPED | OUTPATIENT
Start: 2024-06-21

## 2024-06-21 RX ORDER — AMOXICILLIN AND CLAVULANATE POTASSIUM 875; 125 MG/1; MG/1
1 TABLET, FILM COATED ORAL 2 TIMES DAILY
Qty: 28 TABLET | Refills: 0 | Status: SHIPPED | OUTPATIENT
Start: 2024-06-21 | End: 2024-07-05

## 2024-06-21 RX ADMIN — ATORVASTATIN CALCIUM 80 MG: 40 TABLET, FILM COATED ORAL at 08:06

## 2024-06-21 RX ADMIN — VANCOMYCIN HYDROCHLORIDE 1250 MG: 1.25 INJECTION, POWDER, LYOPHILIZED, FOR SOLUTION INTRAVENOUS at 01:06

## 2024-06-21 RX ADMIN — HYDROCODONE BITARTRATE AND ACETAMINOPHEN 1 TABLET: 10; 325 TABLET ORAL at 11:06

## 2024-06-21 RX ADMIN — ASPIRIN 81 MG: 81 TABLET, COATED ORAL at 08:06

## 2024-06-21 RX ADMIN — MORPHINE SULFATE 3 MG: 4 INJECTION INTRAVENOUS at 04:06

## 2024-06-21 RX ADMIN — FLUTICASONE PROPIONATE 100 MCG: 50 SPRAY, METERED NASAL at 08:06

## 2024-06-21 RX ADMIN — THERA TABS 1 TABLET: TAB at 08:06

## 2024-06-21 RX ADMIN — CEFEPIME 2 G: 2 INJECTION, POWDER, FOR SOLUTION INTRAVENOUS at 05:06

## 2024-06-21 RX ADMIN — HYDROCODONE BITARTRATE AND ACETAMINOPHEN 1 TABLET: 10; 325 TABLET ORAL at 05:06

## 2024-06-21 RX ADMIN — MORPHINE SULFATE 3 MG: 4 INJECTION INTRAVENOUS at 02:06

## 2024-06-21 RX ADMIN — CLOPIDOGREL BISULFATE 75 MG: 75 TABLET ORAL at 08:06

## 2024-06-21 RX ADMIN — MORPHINE SULFATE 3 MG: 4 INJECTION INTRAVENOUS at 06:06

## 2024-06-21 RX ADMIN — APIXABAN 5 MG: 5 TABLET, FILM COATED ORAL at 08:06

## 2024-06-21 RX ADMIN — VANCOMYCIN HYDROCHLORIDE 1250 MG: 1.25 INJECTION, POWDER, LYOPHILIZED, FOR SOLUTION INTRAVENOUS at 02:06

## 2024-06-21 RX ADMIN — CEFEPIME 2 G: 2 INJECTION, POWDER, FOR SOLUTION INTRAVENOUS at 11:06

## 2024-06-21 NOTE — ASSESSMENT & PLAN NOTE
68 year old male with HTN, HLD, BPH, PE on eliquis, CAD s/p PCI, right pelvis sarcoma s/p neoadjuvant chemotherapy followed by resection done at [facility] on May 28th admitted with sepsis. Has had a drain in the surgical wound since the operation which has had robust output. Recently had clinic follow-up at [facility] on 6/17; reported R calf pain and LL edema; X-ray of right pelvis and femur were unremarkable. He was discharged on 500mg PO cefadroxil since resection. CT right hip at [facility] showing subcutaneous edema and stranding, no evidence of collection       Recommendations:  -can continue vancomycin and cefepime while inpatient; switch to Augmentin /125mg BID on discharge, end date 7/3/24 for 14 day course  -follow up in ID clinic with Dr. Bull, clinic will arrange appt.      ID will sign-off

## 2024-06-21 NOTE — PROGRESS NOTES
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 Has had a drain in the surgical wound since the operation which has had robust output. Recently had clinic follow-up at [facility] on 6/17; reported R calf pain and LL edema; X-ray of right pelvis and femur were unremarkable. He was discharged on 500mg PO cefadroxil since resection. CT right hip at [facility] showing subcutaneous edema and stranding, no evidence of collection       Recommendations:  -can continue vancomycin and cefepime while inpatient; switch to Augmentin /125mg BID on discharge, end date 7/3/24 for 14 day course  -follow up in ID clinic with Dr. Bull, clinic will arrange appt.      ID will sign-off        Anticipated Disposition: pending    Thank you for your consult. I will sign off. Please contact us if you have any additional questions.    Pawan Bethea MD  Infectious Disease  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 Has had a drain in the surgical wound since the operation which has had robust output. Recently had clinic follow-up at [facility] on 6/17; reported R calf pain and LL edema; X-ray of right pelvis and femur were unremarkable. Reports progressively worsening pain since his surgery; worst at posterior thigh.  Mobilizing with walker but denies pain in hip joint. Reports fevers and chills with t max 101.9. No URTI symptoms. No abdominal pain, nausea, or vomiting. No dysuria or hematuria. Met SIRS criteria with fever and leukocytosis. Started on broad spectrum abx. Infectious diseases consulted for sepsis and abx management.   Interval History: reports increased in R hip tenderness. Able to ambulate by self with use of walker    Review of Systems   All other systems reviewed and are negative.    Objective:     Vital Signs (Most Recent):  Temp: 98.5 °F (36.9 °C) (06/21/24 1610)  Pulse: 84 (06/21/24 1610)  Resp: 18 (06/21/24 1610)  BP: (!) 166/74 (06/21/24 1610)  SpO2: 98 % (06/21/24 1610) Vital Signs (24h Range):  Temp:  [97.9 °F (36.6 °C)-98.6 °F (37 °C)] 98.5 °F (36.9 °C)  Pulse:  [] 84  Resp:  [16-18] 18  SpO2:  [92 %-98 %] 98 %  BP: (125-166)/(60-90) 166/74     Weight: 105.2 kg (232 lb)  Body mass index is 34.26 kg/m².    Estimated Creatinine Clearance: 102.7 mL/min (based on SCr of 0.8 mg/dL).     Physical Exam  Constitutional:       General: He is not in acute distress.     Appearance: Normal appearance. He is not ill-appearing.   HENT:      Head: Atraumatic.      Right Ear: External ear normal.      Left Ear: External ear normal.      Nose: Nose normal.      Mouth/Throat:      Mouth: Mucous membranes are moist.      Pharynx: Oropharynx is clear.   Eyes:      General:         Right eye: No discharge.         Left eye: No discharge.      Extraocular Movements: Extraocular movements intact.   Cardiovascular:      Rate and Rhythm: Normal rate and regular rhythm.      Heart sounds: Normal heart sounds. No murmur heard.  Pulmonary:      Effort: Pulmonary effort is normal. No respiratory distress.      Breath sounds: Normal breath sounds. No wheezing.   Abdominal:      General: Abdomen is flat. Bowel sounds are normal. There is no distension.      Palpations: Abdomen is soft.      Tenderness: There is no abdominal  tenderness. There is no right CVA tenderness or left CVA tenderness.   Musculoskeletal:         General: Tenderness (R lateral thigh; incision clean and dry, LAURA drain with clear discharge) present. Normal range of motion.      Cervical back: Normal range of motion. No rigidity.      Right lower leg: No edema.      Left lower leg: No edema.   Skin:     General: Skin is warm and dry.      Coloration: Skin is not jaundiced.   Neurological:      Mental Status: He is alert and oriented to person, place, and time. Mental status is at baseline.      Cranial Nerves: No cranial nerve deficit.   Psychiatric:         Mood and Affect: Mood normal.         Behavior: Behavior normal.          Significant Labs: All pertinent labs within the past 24 hours have been reviewed.    Significant Imaging: I have reviewed all pertinent imaging results/findings within the past 24 hours.

## 2024-06-21 NOTE — PROGRESS NOTES
Pharmacokinetic Assessment Follow Up: IV Vancomycin    Vancomycin serum concentration assessment(s):    The trough level was drawn correctly and can be used to guide therapy at this time. The measurement is within the desired definitive target range of 10 to 20 mcg/mL.  - The trough level was drawn ~12 hours after previous dose and resulted at 17.0.    Vancomycin Regimen Plan:    Change regimen to Vancomycin 1250 mg IV every 12 hours with next serum trough concentration measured at 1300 prior to 4th dose on 6/22.  - Although the level was within range, I'm worried he will continue to accumulate given his age and BMI. Decreased dose to avoid further accumulation.   - Mr. Sommers's renal function appears stable and at baseline.  - Please draw random level sooner than scheduled trough if renal function changes significantly.    Drug levels (last 3 results):  Recent Labs   Lab Result Units 06/21/24  0031   Vancomycin-Trough ug/mL 17.0       Pharmacy will continue to follow and monitor vancomycin.    Please contact pharmacy at extension [phone] for questions regarding this assessment.    Thank you for the consult,   Mercedes Jiménez       Patient brief summary:  Steven Sommers is a 68 y.o. male initiated on antimicrobial therapy with IV Vancomycin for treatment of sepsis    The patient's previous regimen was 1500 mg Q12H    Actual Body Weight:   105.2 kg    Renal Function:   Estimated Creatinine Clearance: 91.3 mL/min (based on SCr of 0.9 mg/dL).     Dialysis Method (if applicable):  N/A

## 2024-06-21 NOTE — PROGRESS NOTES
[facility] - Med Surg ([address] Medicine  Progress Note    Patient Name: Steven Sommers  MRN: [id number]  Patient Class: IP- Inpatient   Admission Date: 6/19/2024  Length of Stay: 0 days  Attending Physician: Stevan Nugent MD  Primary Care Provider: Hai Fitzgerald MD        Subjective:     Principal Problem:Sepsis        HPI:  68-year-old male with a history of hypertension, pulmonary embolism?, right pelvis sarcoma s/p neoadjuvant chemotherapy followed by a resection done at [facility] on May 28th, who presents to the ED with chief complaint of fever/chills.  Patient has had right thigh drain in place since surgery, has had reasonably robust fluid output.  Recently had clinic follow up at [facility] on 06/17.  Provider at that visit had concern for RLE pain at calf and right lower leg swelling.  X-ray of right pelvis and femur were performed, with no concerning findings.  Patient states he also had a scan for DVT but I cannot see this in records.  Patient takes Norco 5 mg at home for postsurgical pain.  He states his pain has not really been well controlled over the past few weeks.  Pain is worse in right low back and right lower posterior thigh.  He noticed over the past few days pain has gotten more severe.  Yesterday patient had acute onset of new fever and chills. Last night patient's T-max was 101.9°. With a fever he has had some shaking chills but no other new symptoms.  He does not know increased pain at the surgical site over the past day. No URI symptoms, no vomiting diarrhea, no abdominal pain.  No urinary complaints.  Patient has noticed bilateral lower extremity edema for a few weeks.    Patient presented to the ED with temperature 99.9°, /57 and O2 sats 97% on room air.  Labs showed WBC elevated 13.8 with hemoglobin 10.6.  Patient treated in the ED with 1 L IV LR, IV morphine, IV Zosyn and vancomycin.    Overview/Hospital Course:  No notes on file    Interval History:    No events overnight. Surg onc evaluated patient tin AM and placed stitch to help secure drain      Review of Systems  Objective:     Vital Signs (Most Recent):  Temp: 98.3 °F (36.8 °C) (06/20/24 1612)  Pulse: 87 (06/20/24 1612)  Resp: 18 (06/20/24 1612)  BP: (!) 146/67 (06/20/24 1612)  SpO2: (!) 94 % (06/20/24 1612) Vital Signs (24h Range):  Temp:  [98.2 °F (36.8 °C)-99.5 °F (37.5 °C)] 98.3 °F (36.8 °C)  Pulse:  [] 87  Resp:  [16-19] 18  SpO2:  [94 %-100 %] 94 %  BP: (110-156)/(63-81) 146/67     Weight: 105.2 kg (232 lb)  Body mass index is 34.26 kg/m².    Intake/Output Summary (Last 24 hours) at 6/20/2024 1648  Last data filed at 6/20/2024 1151  Gross per 24 hour   Intake --   Output 1810 ml   Net -1810 ml         Physical Exam        Significant Labs: All pertinent labs within the past 24 hours have been reviewed.  CBC:   Recent Labs   Lab 06/19/24  1128 06/20/24  0425   WBC 13.88* 8.96   HGB 10.6* 9.5*   HCT 33.1* 30.1*    232     CMP:   Recent Labs   Lab 06/19/24  1128 06/20/24  0425    138   K 2.9* 3.1*    102   CO2 27 27   * 97   BUN 22 18   CREATININE 1.0 0.9   CALCIUM 9.1 8.9   PROT 6.0  --    ALBUMIN 3.0*  --    BILITOT 0.8  --    ALKPHOS 71  --    AST 13  --    ALT 10  --    ANIONGAP 9 9       Significant Imaging: I have reviewed all pertinent imaging results/findings within the past 24 hours.    Assessment/Plan:      * Sepsis  Cellulitis of leg  Postoperative cellulitis of surgical wound    SIRS criteria positive on admission with fever and leukocytosis with hypotension      My overall impression is sepsis.  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 Most recent potassium levels reviewed-   Lab Results   Component Value Date    K 3.1 (L) 06/20/2024     Will continue potassium replacement per protocol and recheck repeat levels after replacement completed.     Normocytic anemia  Patient's anemia is currently controlled. Has not received any PRBCs to date.   Hemoglobin appears consistent with prior value.  Suspect due to chronic inflammation from malignancy and possibly some postsurgical blood loss.    Current CBC reviewed-   Lab Results   Component Value Date    HGB 9.5 (L) 06/20/2024    HCT 30.1 (L) 06/20/2024     Monitor serial CBC and transfuse if patient becomes hemodynamically unstable, symptomatic or H/H drops below 7/21.    Leukocytosis  See above      History of pulmonary embolism  1/8/24. Incidental note of Right lower lobe segmental pulmonary embolism.   - Continue home Eliquis    CAD S/P percutaneous coronary angioplasty  - Patient reports history of stenting  - Continue high-intensity statin  - Continue aspirin and Plavix    Essential hypertension  Chronic, controlled. Latest blood pressure and vitals reviewed-     Temp:  [98.2 °F (36.8 °C)-99 °F (37.2 °C)]   Pulse:  []   Resp:  [16-18]   BP: (123-156)/(67-80)   SpO2:  [94 %-100 %] .   Home meds for hypertension were reviewed and noted below.   Hypertension Medications               lisinopril-hydrochlorothiazide (PRINZIDE,ZESTORETIC) 10-12.5 mg per tablet Take 1 tablet by  mouth once daily.    metoprolol succinate (TOPROL-XL) 100 MG 24 hr tablet Take 1 tablet (100 mg total) by mouth once daily.     - While in the hospital, will manage blood pressure as follows; hold patient's home antihypertensives for sepsis  - Titrate antihypertensives  - PRN Hydralazine      VTE Risk Mitigation (From admission, onward)           Ordered     apixaban tablet 5 mg  2 times daily         06/19/24 1814     IP VTE HIGH RISK PATIENT  Once         06/19/24 1508     Place sequential compression device  Until discontinued         06/19/24 1508     Reason for no Mechanical VTE Prophylaxis  Once        Comments: Will order   Question:  Reasons:  Answer:  Physician Provided (leave comment)    06/19/24 1508     Reason for No Pharmacological VTE Prophylaxis  Once        Question:  Reasons:  Answer:  Risk of Bleeding    06/19/24 1508                    Discharge Planning   HAM: 6/22/2024     Code Status: Full Code   Is the patient medically ready for discharge?:     Reason for patient still in hospital (select all that apply): Patient trending condition, Laboratory test, Treatment, Consult recommendations, and Pending disposition  Discharge Plan A: Home                  Stevan Nugent MD  Department of Hospital Medicine   [facility] Surg ([place])

## 2024-06-21 NOTE — PLAN OF CARE
CM spoke with pt in room, instructed in d/c process. Pt v/u.  Dispo home.  His girlfriend will give him a ride home.  No DME needed.    MIRIAM CowanN, BS, RN, CCM

## 2024-06-21 NOTE — PLAN OF CARE
Problem: Adult Inpatient Plan of Care  Goal: Plan of Care Review  Outcome: Progressing  Goal: Patient-Specific Goal (Individualized)  Outcome: Progressing  Goal: Absence of Hospital-Acquired Illness or Injury  Outcome: Progressing  Goal: Optimal Comfort and Wellbeing  Outcome: Progressing  Goal: Readiness for Transition of Care  Outcome: Progressing     Problem: Sepsis/Septic Shock  Goal: Optimal Coping  Outcome: Progressing  Goal: Absence of Bleeding  Outcome: Progressing  Goal: Blood Glucose Level Within Targeted Range  Outcome: Progressing  Goal: Absence of Infection Signs and Symptoms  Outcome: Progressing  Goal: Optimal Nutrition Intake  Outcome: Progressing     Problem: Fall Injury Risk  Goal: Absence of Fall and Fall-Related Injury  Outcome: Progressing   Pt AAO X 4; able to express needs.  Pain controlled with PO and IV meds.  Antibiotics given as ordered.  Vanc. Trough last night.  MD changed Vanc dose to 1250mg.    Next Vanc. Trough 6/22/24 at 1300.  Drain emptied and recorded x2.   Safety maintained.  Bed in low position,  call  light in reach.

## 2024-06-21 NOTE — SUBJECTIVE & OBJECTIVE
Interval History: reports increased in R hip tenderness. Able to ambulate by self with use of walker    Review of Systems   All other systems reviewed and are negative.    Objective:     Vital Signs (Most Recent):  Temp: 98.5 °F (36.9 °C) (06/21/24 1610)  Pulse: 84 (06/21/24 1610)  Resp: 18 (06/21/24 1610)  BP: (!) 166/74 (06/21/24 1610)  SpO2: 98 % (06/21/24 1610) Vital Signs (24h Range):  Temp:  [97.9 °F (36.6 °C)-98.6 °F (37 °C)] 98.5 °F (36.9 °C)  Pulse:  [] 84  Resp:  [16-18] 18  SpO2:  [92 %-98 %] 98 %  BP: (125-166)/(60-90) 166/74     Weight: 105.2 kg (232 lb)  Body mass index is 34.26 kg/m².    Estimated Creatinine Clearance: 102.7 mL/min (based on SCr of 0.8 mg/dL).     Physical Exam  Constitutional:       General: He is not in acute distress.     Appearance: Normal appearance. He is not ill-appearing.   HENT:      Head: Atraumatic.      Right Ear: External ear normal.      Left Ear: External ear normal.      Nose: Nose normal.      Mouth/Throat:      Mouth: Mucous membranes are moist.      Pharynx: Oropharynx is clear.   Eyes:      General:         Right eye: No discharge.         Left eye: No discharge.      Extraocular Movements: Extraocular movements intact.   Cardiovascular:      Rate and Rhythm: Normal rate and regular rhythm.      Heart sounds: Normal heart sounds. No murmur heard.  Pulmonary:      Effort: Pulmonary effort is normal. No respiratory distress.      Breath sounds: Normal breath sounds. No wheezing.   Abdominal:      General: Abdomen is flat. Bowel sounds are normal. There is no distension.      Palpations: Abdomen is soft.      Tenderness: There is no abdominal tenderness. There is no right CVA tenderness or left CVA tenderness.   Musculoskeletal:         General: Tenderness (R lateral thigh; incision clean and dry, LAURA drain with clear discharge) present. Normal range of motion.      Cervical back: Normal range of motion. No rigidity.      Right lower leg: No edema.      Left  lower leg: No edema.   Skin:     General: Skin is warm and dry.      Coloration: Skin is not jaundiced.   Neurological:      Mental Status: He is alert and oriented to person, place, and time. Mental status is at baseline.      Cranial Nerves: No cranial nerve deficit.   Psychiatric:         Mood and Affect: Mood normal.         Behavior: Behavior normal.          Significant Labs: All pertinent labs within the past 24 hours have been reviewed.    Significant Imaging: I have reviewed all pertinent imaging results/findings within the past 24 hours.

## 2024-06-21 NOTE — PLAN OF CARE
CHW met with patient/family at bedside. Patient experience rounding completed and reviewed the following.     Do you know your discharge plan? Yes or No,    If yes, what is the plan? (Home, Home Health, Rehab, SNF, LTAC, or Other) Yes Home    Have you discussed your needs and preferences with your SW/CM? Yes or No  Yes Home    If you are discharging home, do you have help at home? Yes or No Yes (s/o)    Do you think you will need help additional at home at discharge? Yes or No  No    Do you currently have difficulty keeping up with bills, affording medicine or buying food? Yes or No No    Assigned SW/CM notified of any patient/family needs or concerns. Appropriate resources provided to address patient's needs.  Breanna Cody CHW  Case Management   [phone]

## 2024-06-21 NOTE — PLAN OF CARE
Problem: Adult Inpatient Plan of Care  Goal: Plan of Care Review  Outcome: Met  Flowsheets (Taken 6/21/2024 4764)  Plan of Care Reviewed With:   patient   significant other  Goal: Patient-Specific Goal (Individualized)  Outcome: Met  Goal: Absence of Hospital-Acquired Illness or Injury  Outcome: Met  Goal: Optimal Comfort and Wellbeing  Outcome: Met  Goal: Readiness for Transition of Care  Outcome: Met  Pt is AAOX4, pt educated on discharge meds and follow up appointments,  AVS given to pt.  Pt voiced understanding. Pt transported via wheelchair accompanied by family.

## 2024-06-22 NOTE — DISCHARGE SUMMARY
[facility] - Med Surg ([address] Medicine  Discharge Summary      Patient Name: Steven Sommers  MRN: [id number]  [facility]: [id number]  Patient Class: IP- Inpatient  Admission Date: 6/19/2024  Hospital Length of Stay: 1 days  Discharge Date and Time: 6/21/2024  6:29 PM  Attending Physician: No att. providers found   Discharging Provider: Stevan Nugent MD  Primary Care Provider: Hai Fitzgerald MD  [facility] Medicine Team: [facility] HOSP MED D Stevan Nugent MD  Primary Care Team: [facility] D    HPI:   68-year-old male with a history of hypertension, pulmonary embolism?, right pelvis sarcoma s/p neoadjuvant chemotherapy followed by a resection done at [facility] on May 28th, who presents to the ED with chief complaint of fever/chills.  Patient has had right thigh drain in place since surgery, has had reasonably robust fluid output.  Recently had clinic follow up at [facility] on 06/17.  Provider at that visit had concern for RLE pain at calf and right lower leg swelling.  X-ray of right pelvis and femur were performed, with no concerning findings.  Patient states he also had a scan for DVT but I cannot see this in records.  Patient takes Norco 5 mg at home for postsurgical pain.  He states his pain has not really been well controlled over the past few weeks.  Pain is worse in right low back and right lower posterior thigh.  He noticed over the past few days pain has gotten more severe.  Yesterday patient had acute onset of new fever and chills. Last night patient's T-max was 101.9°. With a fever he has had some shaking chills but no other new symptoms.  He does not know increased pain at the surgical site over the past day. No URI symptoms, no vomiting diarrhea, no abdominal pain.  No urinary complaints.  Patient has noticed bilateral lower extremity edema for a few weeks.    Patient presented to the ED with temperature 99.9°, /57 and O2 sats 97% on room air.  Labs showed WBC elevated 13.8  with hemoglobin 10.6.  Patient treated in the ED with 1 L IV LR, IV morphine, IV Zosyn and vancomycin.    * No surgery found *      Hospital Course:    Sepsis  Cellulitis of leg  Postoperative cellulitis of surgical wound     SIRS criteria positive on admission with fever and leukocytosis with hypotension     My overall impression is sepsis.  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 I personally saw, examined, and evaluated patient prior to departure. Plan discussed with patient, who was agreeable and amenable; medications were discussed and reviewed, outpatient follow-up scheduled, ER precautions were given, all questions were answered to the patient's satisfaction, and Steven Sommers was subsequently discharged.       Goals of Care Treatment Preferences:  Code Status: Full Code      Consults:   Consults (From admission, onward)          Status Ordering Provider     Inpatient consult to PICC team (NIAS)  Once        Provider:  (Not yet assigned)    Completed PAULA VILLANUEVA     Inpatient consult to Infectious Diseases  Once        Provider:  (Not yet  "assigned)    Completed PAULA VILLANUEVA     Pharmacy to dose Vancomycin consult  Once        Provider:  (Not yet assigned)   Placed in "And" Linked Group    Acknowledged VANITA KINGSLEY     Inpatient consult to Surgical Oncology  Once        Provider:  (Not yet assigned)    Completed VANITA KINGSLEY            No new Assessment & Plan notes have been filed under this hospital service since the last note was generated.  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, including hip [C49.21] 10/17/2023 Yes    Postoperative cellulitis of surgical wound [T81.49XA] 06/20/2024 Yes    Cellulitis of leg [L03.119] 06/20/2024 Yes    Normocytic anemia [D64.9] 06/19/2024 Yes    Hypokalemia [E87.6] 06/19/2024 Yes    History of pulmonary embolism [Z86.711] 01/09/2024 Yes    Leukocytosis [D72.829] 12/15/2023 Yes    CAD S/P percutaneous coronary angioplasty [I25.10, Z98.61] 11/09/2018 Not Applicable    Essential hypertension [I10] 02/01/2016 Yes     Chronic      Problems Resolved During this Admission:       Discharged Condition: good    Disposition: Home or Self Care    Follow Up:    Patient Instructions:      Ambulatory referral/consult to Internal Medicine   Standing Status: Future   Referral Priority: Routine Referral Type: Consultation   Referral Reason: Specialty Services Required   Requested Specialty: Internal Medicine   Number of Visits Requested: 1     Diet Adult Regular     Notify your health care provider if you experience any of the following:  increased confusion or weakness     Notify your health care provider if you experience any of the following:  persistent dizziness, light-headedness, or visual disturbances     Notify your health care provider if you experience any of the following:  worsening rash     Notify your health care provider if you " experience any of the following:  severe persistent headache     Notify your health care provider if you experience any of the following:  difficulty breathing or increased cough     Notify your health care provider if you experience any of the following:  redness, tenderness, or signs of infection (pain, swelling, redness, odor or green/yellow discharge around incision site)     Notify your health care provider if you experience any of the following:  severe uncontrolled pain     Notify your health care provider if you experience any of the following:  persistent nausea and vomiting or diarrhea     Notify your health care provider if you experience any of the following:  temperature >100.4     Activity as tolerated       Significant Diagnostic Studies: Labs: All labs within the past 24 hours have been reviewed    Pending Diagnostic Studies:       None           Medications:  Reconciled Home Medications:      Medication List        START taking these medications      amoxicillin-clavulanate 875-125mg 875-125 mg per tablet  Commonly known as: AUGMENTIN  Take 1 tablet by mouth 2 (two) times daily. for 14 days     clopidogreL 75 mg tablet  Commonly known as: PLAVIX  Take 1 tablet (75 mg total) by mouth once daily.  Start taking on: June 22, 2024     HYDROcodone-acetaminophen  mg per tablet  Commonly known as: NORCO  Take 1 tablet by mouth every 6 (six) hours as needed for Pain.  Replaces: HYDROcodone-acetaminophen 5-325 mg per tablet     multivitamin Tab  Take 1 tablet by mouth once daily.  Start taking on: June 22, 2024            CONTINUE taking these medications      atorvastatin 80 MG tablet  Commonly known as: LIPITOR  Take 1 tablet (80 mg total) by mouth once daily.     ELIQUIS 2.5 mg Tab  Generic drug: apixaban  Take 5 mg by mouth 2 (two) times daily.     gabapentin 100 MG capsule  Commonly known as: NEURONTIN  Take 100 mg by mouth 2 (two) times daily.     lisinopriL-hydrochlorothiazide 10-12.5 mg per  tablet  Commonly known as: PRINZIDE,ZESTORETIC  Take 1 tablet by mouth once daily.     methocarbamoL 500 MG Tab  Commonly known as: ROBAXIN  Take 500 mg by mouth every 8 (eight) hours as needed (as needed for muscle spasms.).     metoprolol succinate 100 MG 24 hr tablet  Commonly known as: TOPROL-XL  Take 1 tablet (100 mg total) by mouth once daily.            STOP taking these medications      cefadroxil 500 MG Cap  Commonly known as: DURICEF     HYDROcodone-acetaminophen 5-325 mg per tablet  Commonly known as: NORCO  Replaced by: HYDROcodone-acetaminophen  mg per tablet            ASK your doctor about these medications      aspirin 81 MG EC tablet  Commonly known as: ECOTRIN  Take 1 tablet (81 mg total) by mouth once daily.              Indwelling Lines/Drains at time of discharge:   Lines/Drains/Airways       Drain  Duration                  Closed/Suction Drain Proximal;Right;Anterior Hip -- days                    Time spent on the discharge of patient: 35 minutes         Stevan Nugent MD  Department of Hospital Medicine  [facility] Surg ([place])

## 2024-06-22 NOTE — HOSPITAL COURSE
Sepsis  Cellulitis of leg  Postoperative cellulitis of surgical wound     SIRS criteria positive on admission with fever and leukocytosis with hypotension     My overall impression is sepsis.  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 I personally saw, examined, and evaluated patient prior to departure. Plan discussed with patient, who was agreeable and amenable; medications were discussed and reviewed, outpatient follow-up scheduled, ER precautions were given, all questions were answered to the patient's satisfaction, and Steven Sommers was subsequently discharged.

## 2024-06-23 ENCOUNTER — PATIENT MESSAGE (OUTPATIENT)
Dept: INFECTIOUS DISEASES | Facility: CLINIC | Age: 69
End: 2024-06-23
Payer: MEDICARE

## 2024-06-24 LAB
BACTERIA BLD CULT: NORMAL
BACTERIA BLD CULT: NORMAL

## 2024-06-24 NOTE — PLAN OF CARE
[facility] - Med Surg ([facility])  Discharge Final Note    Primary Care Provider: Hai Fitzgerald MD    Expected Discharge Date: 6/21/2024    Final Discharge Note (most recent)       Final Note - 06/24/24 0819          Final Note    Assessment Type Final Discharge Note (P)      Anticipated Discharge Disposition Home or Self Care (P)         Post-Acute Status    Coverage [employer] (P)      Discharge Delays None known at this time (P)                      Important Message from Medicare  Important Message from Medicare regarding Discharge Appeal Rights: Given to patient/caregiver, Explained to patient/caregiver, Signed/date by patient/caregiver     Date IMM was signed: 06/21/24  Time IMM was signed: 1810  Pt dispo home. His girlfriend will give him a ride home. No DME needed.    MIRIAM CowanN, BS, RN, CCM

## 2024-07-03 ENCOUNTER — OFFICE VISIT (OUTPATIENT)
Dept: INFECTIOUS DISEASES | Facility: CLINIC | Age: 69
End: 2024-07-03
Payer: MEDICARE

## 2024-07-03 DIAGNOSIS — C49.21 MALIGNANT NEOPLASM OF CONNECTIVE AND SOFT TISSUE OF RIGHT LOWER LIMB, INCLUDING HIP: ICD-10-CM

## 2024-07-03 DIAGNOSIS — L03.115 CELLULITIS OF RIGHT LOWER EXTREMITY: Primary | ICD-10-CM

## 2024-07-03 NOTE — PROGRESS NOTES
The patient location is: Home  The chief complaint leading to consultation is: Follow-up for cellulitis    Visit type: audiovisual    Face to Face time with patient: 10 mins  30 minutes of total time spent on the encounter, which includes face to face time and non-face to face time preparing to see the patient (eg, review of tests), Obtaining and/or reviewing separately obtained history, Documenting clinical information in the electronic or other health record, Independently interpreting results (not separately reported) and communicating results to the patient/family/caregiver, or Care coordination (not separately reported).         Each patient to whom he or she provides medical services by telemedicine is:  (1) informed of the relationship between the physician and patient and the respective role of any other health care provider with respect to management of the patient; and (2) notified that he or she may decline to receive medical services by telemedicine and may withdraw from such care at any time.    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    On May 28, 2024, patient was admitted with fevers and increasing right LE redness and swelling. CT right hip showed subcutaneous edema and stranding, no evidence of collection. The drain did come out a couple of inches and needed to be sutured in placed. Patient had been on cefadroxil when cellulitis occurred, discharged on course of amox-clav.    Patient reports going back to [facility] for follow-up visit. He will be seeing interventional radiology to get the drain repositioned. He has to manipulate the bulb to get fluid to come out of the drain. If fluid accumulates, he has pain and swelling. Otherwise, redness, swelling, and warmth have  resolved. The course of amox-clav was extended for another 10 day course.    Patient denied having any adverse effects related to amox-clav. Denied having any diarrhea, reports constipation related to pain medications.    Review of Systems   Constitutional:  Negative for chills, diaphoresis, fever and weight loss.   HENT:  Negative for congestion, sinus pain and sore throat.    Eyes:  Negative for photophobia and pain.   Respiratory:  Negative for cough, sputum production and shortness of breath.    Cardiovascular:  Negative for chest pain and leg swelling.   Gastrointestinal:  Negative for abdominal pain, diarrhea, nausea and vomiting.   Genitourinary:  Negative for dysuria and hematuria.   Musculoskeletal:  Negative for joint pain.        Intermittent leg swelling   Skin:  Negative for rash.   Neurological:  Negative for focal weakness and headaches.   Psychiatric/Behavioral:  Negative for depression. The patient is not nervous/anxious.          Objective:   Physical Exam  Vitals reviewed.   Constitutional:       General: He is not in acute distress.     Appearance: He is well-developed. He is not diaphoretic.   HENT:      Head: Normocephalic and atraumatic.      Nose: Nose normal.   Eyes:      Conjunctiva/sclera: Conjunctivae normal.   Pulmonary:      Effort: Pulmonary effort is normal. No respiratory distress.   Abdominal:      General: Abdomen is flat. There is no distension.   Musculoskeletal:      Cervical back: Normal range of motion.      Right lower leg: No edema.      Left lower leg: No edema.   Skin:     General: Skin is warm and dry.      Findings: No erythema or rash.   Neurological:      Mental Status: He is alert.   Psychiatric:         Behavior: Behavior normal.           Significant results reviewed:    Sodium   Date Value Ref Range Status   06/21/2024 135 (L) 136 - 145 mmol/L Final   06/20/2024 138 136 - 145 mmol/L Final   06/19/2024 137 136 - 145 mmol/L Final      Potassium   Date Value Ref Range  Status   06/21/2024 3.7 3.5 - 5.1 mmol/L Final   06/20/2024 3.1 (L) 3.5 - 5.1 mmol/L Final   06/19/2024 2.9 (L) 3.5 - 5.1 mmol/L Final      Chloride   Date Value Ref Range Status   06/21/2024 104 95 - 110 mmol/L Final   06/20/2024 102 95 - 110 mmol/L Final   06/19/2024 101 95 - 110 mmol/L Final      CO2   Date Value Ref Range Status   06/21/2024 24 23 - 29 mmol/L Final   06/20/2024 27 23 - 29 mmol/L Final   06/19/2024 27 23 - 29 mmol/L Final      BUN   Date Value Ref Range Status   06/21/2024 18 8 - 23 mg/dL Final   06/20/2024 18 8 - 23 mg/dL Final   06/19/2024 22 8 - 23 mg/dL Final      Creatinine   Date Value Ref Range Status   06/21/2024 0.8 0.5 - 1.4 mg/dL Final   06/20/2024 0.9 0.5 - 1.4 mg/dL Final   06/19/2024 1.0 0.5 - 1.4 mg/dL Final      Glucose   Date Value Ref Range Status   06/21/2024 120 (H) 70 - 110 mg/dL Final   06/20/2024 97 70 - 110 mg/dL Final   06/19/2024 133 (H) 70 - 110 mg/dL Final       ALT   Date Value Ref Range Status   06/19/2024 10 10 - 44 U/L Final   04/30/2024 16 10 - 44 U/L Final   10/19/2022 12 9 - 46 U/L Final   03/27/2022 24 16 - 61 U/L Final   05/20/2019 16 10 - 44 U/L Final      AST   Date Value Ref Range Status   06/19/2024 13 10 - 40 U/L Final   04/30/2024 20 10 - 40 U/L Final   10/19/2022 15 10 - 35 U/L Final   03/27/2022 17 15 - 37 U/L Final   05/20/2019 18 10 - 40 U/L Final      Total Bilirubin   Date Value Ref Range Status   06/19/2024 0.8 0.1 - 1.0 mg/dL Final     Comment:     For infants and newborns, interpretation of results should be based  on gestational age, weight and in agreement with clinical  observations.    Premature Infant recommended reference ranges:  Up to 24 hours.............<8.0 mg/dL  Up to 48 hours............<12.0 mg/dL  3-5 days..................<15.0 mg/dL  6-29 days.................<15.0 mg/dL     04/30/2024 0.4 0.1 - 1.0 mg/dL Final     Comment:     For infants and newborns, interpretation of results should be based  on gestational age, weight and  in agreement with clinical  observations.    Premature Infant recommended reference ranges:  Up to 24 hours.............<8.0 mg/dL  Up to 48 hours............<12.0 mg/dL  3-5 days..................<15.0 mg/dL  6-29 days.................<15.0 mg/dL     10/19/2022 0.4 0.2 - 1.2 mg/dL Final   03/27/2022 0.4 0.2 - 1.0 mg/dL Final     Comment:     Use of this assay is not recommended for patients undergoing treatment with eltrombopag due to potential for falsely elevated results.   05/20/2019 0.3 0.1 - 1.0 mg/dL Final     Comment:     For infants and newborns, interpretation of results should be based  on gestational age, weight and in agreement with clinical  observations.  Premature Infant recommended reference ranges:  Up to 24 hours.............<8.0 mg/dL  Up to 48 hours............<12.0 mg/dL  3-5 days..................<15.0 mg/dL  6-29 days.................<15.0 mg/dL        Albumin   Date Value Ref Range Status   06/21/2024 2.3 (L) 3.5 - 5.2 g/dL Final   06/19/2024 3.0 (L) 3.5 - 5.2 g/dL Final   04/30/2024 2.6 (L) 3.5 - 5.2 g/dL Final      Total Protein   Date Value Ref Range Status   06/19/2024 6.0 6.0 - 8.4 g/dL Final   04/30/2024 5.8 (L) 6.0 - 8.4 g/dL Final   05/20/2019 6.7 6.0 - 8.4 g/dL Final      Alkaline Phosphatase   Date Value Ref Range Status   06/19/2024 71 55 - 135 U/L Final   04/30/2024 79 55 - 135 U/L Final   10/19/2022 75 35 - 144 U/L Final   05/20/2019 79 55 - 135 U/L Final        WBC   Date Value Ref Range Status   06/21/2024 7.73 3.90 - 12.70 K/uL Final   06/20/2024 8.96 3.90 - 12.70 K/uL Final   06/19/2024 13.88 (H) 3.90 - 12.70 K/uL Final      Hemoglobin   Date Value Ref Range Status   06/21/2024 9.1 (L) 14.0 - 18.0 g/dL Final   06/20/2024 9.5 (L) 14.0 - 18.0 g/dL Final   06/19/2024 10.6 (L) 14.0 - 18.0 g/dL Final      Hematocrit   Date Value Ref Range Status   06/21/2024 29.1 (L) 40.0 - 54.0 % Final   06/20/2024 30.1 (L) 40.0 - 54.0 % Final   06/19/2024 33.1 (L) 40.0 - 54.0 % Final       Platelets   Date Value Ref Range Status   06/21/2024 243 150 - 450 K/uL Final   06/20/2024 232 150 - 450 K/uL Final   06/19/2024 261 150 - 450 K/uL Final            Assessment:   68M HTN, HLD, BPH, PE, CAD s/p PCI, 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    - Results reviewed as above      Plan:   - Continue amox-clav 875-125 mg PO BID  - Follow-up with surgeon at [facility]    30 minutes of total time spent on the encounter, which includes face to face time and non-face to face time preparing to see the patient (eg, review of tests), Obtaining and/or reviewing separately obtained history, Documenting clinical information in the electronic or other health record, Independently interpreting results (not separately reported) and communicating results to the patient/family/caregiver, or Care coordination (not separately reported).     This patient's visit complexity is inherent to evaluation and management associated with medical care services that are part of ongoing care related to this patient's single, serious condition or complex condition.       Caridad Bull MD MPH  [facility] - Infectious Disease